# Patient Record
Sex: FEMALE | Race: WHITE | Employment: FULL TIME | URBAN - METROPOLITAN AREA
[De-identification: names, ages, dates, MRNs, and addresses within clinical notes are randomized per-mention and may not be internally consistent; named-entity substitution may affect disease eponyms.]

---
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
## 2023-07-26 DIAGNOSIS — M79.2 NEURALGIA: Primary | ICD-10-CM

## 2023-08-14 ENCOUNTER — OFFICE VISIT (OUTPATIENT)
Dept: CARDIOLOGY CLINIC | Age: 23
End: 2023-08-14
Payer: COMMERCIAL

## 2023-08-14 VITALS
SYSTOLIC BLOOD PRESSURE: 86 MMHG | WEIGHT: 162.6 LBS | HEIGHT: 66 IN | HEART RATE: 88 BPM | DIASTOLIC BLOOD PRESSURE: 60 MMHG | BODY MASS INDEX: 26.13 KG/M2 | OXYGEN SATURATION: 100 %

## 2023-08-14 DIAGNOSIS — R55 VASOVAGAL SYNCOPE: ICD-10-CM

## 2023-08-14 DIAGNOSIS — R42 DIZZINESS: ICD-10-CM

## 2023-08-14 DIAGNOSIS — I95.1 ORTHOSTATIC HYPOTENSION: ICD-10-CM

## 2023-08-14 PROCEDURE — G8419 CALC BMI OUT NRM PARAM NOF/U: HCPCS | Performed by: INTERNAL MEDICINE

## 2023-08-14 PROCEDURE — 99213 OFFICE O/P EST LOW 20 MIN: CPT | Performed by: INTERNAL MEDICINE

## 2023-08-14 PROCEDURE — G8427 DOCREV CUR MEDS BY ELIG CLIN: HCPCS | Performed by: INTERNAL MEDICINE

## 2023-08-14 PROCEDURE — A4660 SPHYG/BP APP W CUFF AND STET: HCPCS | Performed by: INTERNAL MEDICINE

## 2023-08-14 RX ORDER — ALBUTEROL SULFATE 90 UG/1
AEROSOL, METERED RESPIRATORY (INHALATION)
COMMUNITY
Start: 2023-06-05

## 2023-08-14 RX ORDER — VALACYCLOVIR HYDROCHLORIDE 500 MG/1
TABLET, FILM COATED ORAL DAILY
COMMUNITY

## 2023-08-14 RX ORDER — CHOLECALCIFEROL (VITAMIN D3) 125 MCG
CAPSULE ORAL NIGHTLY
COMMUNITY

## 2023-08-14 RX ORDER — CLINDAMYCIN PHOSPHATE 10 UG/ML
LOTION TOPICAL
COMMUNITY
Start: 2018-01-09

## 2023-08-14 RX ORDER — BUSPIRONE HYDROCHLORIDE 15 MG/1
TABLET ORAL 2 TIMES DAILY
COMMUNITY
Start: 2023-08-06

## 2023-08-14 RX ORDER — HYOSCYAMINE SULFATE 0.125 MG
TABLET,DISINTEGRATING ORAL AS NEEDED
COMMUNITY

## 2023-08-14 RX ORDER — HYDROXYZINE 50 MG/1
50 TABLET, FILM COATED ORAL 3 TIMES DAILY
COMMUNITY
Start: 2023-08-06

## 2023-08-14 RX ORDER — OMEPRAZOLE 20 MG/1
20 CAPSULE, DELAYED RELEASE ORAL DAILY
COMMUNITY
Start: 2019-09-23

## 2023-08-14 RX ORDER — KETOCONAZOLE 20 MG/G
CREAM TOPICAL
COMMUNITY
Start: 2023-06-05

## 2023-08-14 RX ORDER — MIDODRINE HYDROCHLORIDE 2.5 MG/1
2.5 TABLET ORAL 3 TIMES DAILY
Qty: 90 TABLET | Refills: 3 | Status: SHIPPED | OUTPATIENT
Start: 2023-08-14

## 2023-08-14 RX ORDER — TOPIRAMATE 50 MG/1
150 TABLET, FILM COATED ORAL 2 TIMES DAILY
COMMUNITY
Start: 2023-08-11

## 2023-08-14 RX ORDER — GLYCOPYRROLATE 1 MG/1
TABLET ORAL 2 TIMES DAILY
COMMUNITY

## 2023-08-14 RX ORDER — PRAZOSIN HYDROCHLORIDE 1 MG/1
CAPSULE ORAL NIGHTLY
COMMUNITY
Start: 2023-08-02

## 2023-08-14 RX ORDER — LISDEXAMFETAMINE DIMESYLATE 30 MG/1
CAPSULE ORAL EVERY MORNING
COMMUNITY
Start: 2023-08-11

## 2023-08-14 RX ORDER — BISACODYL 5 MG/1
TABLET, DELAYED RELEASE ORAL 2 TIMES DAILY
COMMUNITY

## 2023-08-14 RX ORDER — SUMATRIPTAN 25 MG/1
TABLET, FILM COATED ORAL
COMMUNITY
Start: 2022-05-31

## 2023-08-14 RX ORDER — GABAPENTIN 100 MG/1
200 CAPSULE ORAL 3 TIMES DAILY
COMMUNITY
Start: 2023-07-20

## 2023-08-14 RX ORDER — ONDANSETRON 4 MG/1
TABLET, ORALLY DISINTEGRATING ORAL
COMMUNITY
Start: 2021-03-02

## 2023-08-14 RX ORDER — DOXYCYCLINE HYCLATE 100 MG
TABLET ORAL 2 TIMES DAILY
COMMUNITY

## 2023-08-14 RX ORDER — FLUOCINOLONE ACETONIDE 0.1 MG/ML
SOLUTION TOPICAL
COMMUNITY

## 2023-08-14 RX ORDER — FLUOXETINE 20 MG/1
40 TABLET, FILM COATED ORAL 2 TIMES DAILY
COMMUNITY
Start: 2023-08-11

## 2023-08-14 RX ORDER — MONTELUKAST SODIUM 10 MG/1
1 TABLET ORAL DAILY
COMMUNITY
Start: 2019-11-06

## 2023-08-14 NOTE — ASSESSMENT & PLAN NOTE
Extensive work-up completed [place] continue to monitor she was counseled extensively with her mother

## 2023-08-14 NOTE — PROGRESS NOTES
she was given a prescription of blood pressure monitor as well all work-up including tilt table echo etc. at [place] was reviewed extensively    Vasovagal syncope   Extensive work-up completed [place] continue to monitor she was counseled extensively with her mother     Dyslipidemia :  All available lab work was reviewed. Patient was advised to repeat lab work before next visit. Necessary orders were placed , instructions given by myself       Counseled extensively and medication compliance urged. We discussed that for the  prevention of ASCVD our  goal is aggressive risk modification. Patient is encouraged to exercise if they can , educated about  brisk walk for 30 minutes  at least 3 to 4 times a week if there are no physical limitations  Various goals were discussed and questions answered. Continue current medications. Appropriate prescriptions are addressed and refills ordered. Questions answered and patient verbalizes understanding. Call for any problems, questions, or concerns. Greater than 60 % of time spent counseling besides reviewing data and images     Continue all other medications of all above medical condition listed as is. Return in about 3 months (around 11/14/2023). Please note this report has been partially produced using speech recognition software and may contain errors related to that system including errors in grammar, punctuation, and spelling, as well as words and phrases that may be inappropriate. If there are any questions or concerns please feel free to contact the dictating provider for clarification.

## 2023-09-12 ENCOUNTER — OFFICE VISIT (OUTPATIENT)
Dept: CARDIOLOGY CLINIC | Age: 23
End: 2023-09-12
Payer: COMMERCIAL

## 2023-09-12 VITALS
HEART RATE: 72 BPM | DIASTOLIC BLOOD PRESSURE: 60 MMHG | BODY MASS INDEX: 26.03 KG/M2 | HEIGHT: 66 IN | SYSTOLIC BLOOD PRESSURE: 92 MMHG | WEIGHT: 162 LBS

## 2023-09-12 DIAGNOSIS — F41.9 ANXIETY: ICD-10-CM

## 2023-09-12 DIAGNOSIS — F32.A DEPRESSION, UNSPECIFIED DEPRESSION TYPE: ICD-10-CM

## 2023-09-12 DIAGNOSIS — I95.1 ORTHOSTATIC HYPOTENSION: ICD-10-CM

## 2023-09-12 DIAGNOSIS — R55 VASOVAGAL SYNCOPE: Primary | ICD-10-CM

## 2023-09-12 DIAGNOSIS — R42 DIZZINESS: ICD-10-CM

## 2023-09-12 PROCEDURE — G8419 CALC BMI OUT NRM PARAM NOF/U: HCPCS | Performed by: INTERNAL MEDICINE

## 2023-09-12 PROCEDURE — 99214 OFFICE O/P EST MOD 30 MIN: CPT | Performed by: INTERNAL MEDICINE

## 2023-09-12 PROCEDURE — G8427 DOCREV CUR MEDS BY ELIG CLIN: HCPCS | Performed by: INTERNAL MEDICINE

## 2023-09-12 PROCEDURE — 1036F TOBACCO NON-USER: CPT | Performed by: INTERNAL MEDICINE

## 2023-09-12 RX ORDER — MIDODRINE HYDROCHLORIDE 5 MG/1
5 TABLET ORAL 3 TIMES DAILY
Qty: 90 TABLET | Refills: 4 | Status: SHIPPED | OUTPATIENT
Start: 2023-09-12

## 2023-09-12 NOTE — PROGRESS NOTES
CARDIOLOGY  NOTE    Chief Complaint: syncope    HPI:   Lisa is a 25y.o. year old who has Past medical history as noted below. She comes in with her mother for evaluation but she has had several admissions and recurrent episodes of syncope and was told that she has vasovagal syncope also orthostatic hypotension her blood pressure tends to be in 80s and 90s she is dizzy and lightheaded also reports having episodes of palpitation with heart running fast she was admitted [facility] where extensive work-up including echo stress test and tilt table were all completed  She feels midodrine has helped   She has an appointment to go to [facility] soon she is here with her mother and her service dog  She was evaluated by neurology as well as cardiology at [facility] and had extensive work-up she is being seen there for migraine headaches and recurrent vagal syncope so far work-up is negative  Was admitted in May 2023 tilt table in 5/20/2023 was not significant for neurocardiogenic syncope      Current Outpatient Medications   Medication Sig Dispense Refill    midodrine (PROAMATINE) 5 MG tablet Take 1 tablet by mouth 3 times daily 90 tablet 4    topiramate (TOPAMAX) 50 MG tablet 3 tablets 2 times daily      SUMAtriptan (IMITREX) 25 MG tablet once as needed      hydrOXYzine HCl (ATARAX) 50 MG tablet 1 tablet 3 times daily      busPIRone (BUSPAR) 15 MG tablet 2 times daily      FLUoxetine (PROZAC) 20 MG tablet 2 tablets 2 times daily Pt takes 20 mg twice a day and 10 mg daily      VYVANSE 30 MG capsule every morning.       prazosin (MINIPRESS) 1 MG capsule nightly      melatonin 5 MG TABS tablet nightly      glycopyrrolate (ROBINUL) 1 MG tablet 2 times daily 2 tab in the morning, 1 tab in the evening      valACYclovir (VALTREX) 500 MG tablet daily      omeprazole (PRILOSEC) 20 MG delayed release capsule 1 capsule in the morning and at bedtime      montelukast

## 2023-09-12 NOTE — PATIENT INSTRUCTIONS
**It is YOUR responsibilty to bring medication bottles and/or updated medication list to [facility]. This will allow us to better serve you and all your healthcare needs**  Thank you for allowing us to care for you today! We want to ensure we can follow your treatment plan and we strive to give you the best outcomes and experience possible. If you ever have a life threatening emergency and call 911 - for an ambulance (EMS)   Our providers can only care for you at:   [facility] or [facility]. Even if you have someone take you or you drive yourself we can only care for you in a [facility] facility. Our providers are not setup at the other healthcare locations! [facility] Locations - No appointment necessary. Sites open Monday to Friday. Call your preferred location for test preparation, business   hours and other information you need. [employer] accepts 's. [address] [place] [address]. [address] [place], [facility]  Phone: [phone]     We are committed to providing you the best care possible. If you receive a survey after visiting one of our offices, please take time to share your experience concerning your physician office visit. These surveys are confidential and no health information about you is shared. We are eager to improve for you and we are counting on your feedback to help make that happen.

## 2023-09-18 ENCOUNTER — TELEPHONE (OUTPATIENT)
Dept: CARDIOLOGY CLINIC | Age: 23
End: 2023-09-18

## 2023-09-18 RX ORDER — GABAPENTIN 100 MG/1
200 CAPSULE ORAL 3 TIMES DAILY
Qty: 180 CAPSULE | Refills: 3 | Status: SHIPPED | OUTPATIENT
Start: 2023-09-18 | End: 2026-09-03

## 2023-09-18 NOTE — TELEPHONE ENCOUNTER
She passed out and was taken to local hospital her gabapentin dose was recntly increased  She underwent CPR and taken to hospital   She was taken a selfie when this happens   She was shaking when she passed out and became non responsive  Advised to reduce gabapentin 200 mg tid   Check bp at home

## 2023-09-22 ENCOUNTER — TELEPHONE (OUTPATIENT)
Dept: CARDIOLOGY CLINIC | Age: 23
End: 2023-09-22

## 2023-09-22 NOTE — TELEPHONE ENCOUNTER
Cardiologist: Dr. Yolanda Romero  Surgeon: Dr. Lorenzo Monroe  Surgery: Adenotonsillectomy, septoplasty, and inferior turbinate lateralization  Anesthesia: General  Date: 9/27/2023  FAX# [phone]  # [phone]    Last OV 9/12/2023 w/[facility]    Orthostatic hypotension  Persistently low lying blood pressures , increase midodrine 5 twice daily and see if it helps advised to check blood pressure at home she was given a prescription of blood pressure monitor as well all work-up including tilt table echo etc. at [place] was reviewed extensively     Vasovagal syncope   Extensive work-up completed [place] continue to monitor she was counseled extensively with her mother   I am worried about polypharmacy       Dyslipidemia :  All available lab work was reviewed. Patient was advised to repeat lab work before next visit.  Necessary orders were placed , instructions given by myself         No testing on file

## 2023-09-25 ENCOUNTER — TELEPHONE (OUTPATIENT)
Dept: CARDIOLOGY CLINIC | Age: 23
End: 2023-09-25

## 2023-09-25 NOTE — TELEPHONE ENCOUNTER
Patient was at the [facility] on 9/22 and passed out. They sent her to ER. Just wanted the doctor to know. Patient is having surgery on 9/27 for her tonsils and nose.

## 2023-09-26 ENCOUNTER — TELEPHONE (OUTPATIENT)
Dept: CARDIOLOGY CLINIC | Age: 23
End: 2023-09-26

## 2023-09-26 NOTE — TELEPHONE ENCOUNTER
Jeremias's surgery will be on Oct.19th at 8:50am at [facility]. Dr. Joesph Francisco will perform her surgery. Fax number is [phone]. Phone number is : [phone]. [place] will be off work 3 weeks or more. Depending on how surgery goes. MRI on back. [facility] [phone] fax number [phone]. Dr. Netta Drummond CMT.

## 2023-10-05 ENCOUNTER — TELEPHONE (OUTPATIENT)
Dept: CARDIOLOGY CLINIC | Age: 23
End: 2023-10-05

## 2023-10-05 ENCOUNTER — HOSPITAL ENCOUNTER (OUTPATIENT)
Age: 23
Discharge: HOME | End: 2023-10-05
Payer: COMMERCIAL

## 2023-10-05 ENCOUNTER — HOSPITAL ENCOUNTER (OUTPATIENT)
Dept: GENERAL RADIOLOGY | Age: 23
Discharge: HOME | End: 2023-10-05
Payer: COMMERCIAL

## 2023-10-05 DIAGNOSIS — M54.12 CERVICAL RADICULOPATHY: ICD-10-CM

## 2023-10-05 DIAGNOSIS — G47.30 SLEEP APNEA IN ADULT: ICD-10-CM

## 2023-10-05 DIAGNOSIS — R55 VASOVAGAL SYNCOPE: Primary | ICD-10-CM

## 2023-10-05 DIAGNOSIS — R42 DIZZINESS: ICD-10-CM

## 2023-10-05 PROCEDURE — 72050 X-RAY EXAM NECK SPINE 4/5VWS: CPT

## 2023-10-11 ENCOUNTER — HOSPITAL ENCOUNTER (EMERGENCY)
Age: 23
Discharge: HOME | End: 2023-10-11
Attending: EMERGENCY MEDICINE
Payer: COMMERCIAL

## 2023-10-11 ENCOUNTER — HOSPITAL ENCOUNTER (OUTPATIENT)
Dept: PHYSICAL THERAPY | Age: 23
Setting detail: THERAPIES SERIES
Discharge: HOME | End: 2023-10-11
Payer: COMMERCIAL

## 2023-10-11 ENCOUNTER — APPOINTMENT (OUTPATIENT)
Dept: GENERAL RADIOLOGY | Age: 23
End: 2023-10-11
Payer: COMMERCIAL

## 2023-10-11 VITALS
SYSTOLIC BLOOD PRESSURE: 102 MMHG | RESPIRATION RATE: 15 BRPM | OXYGEN SATURATION: 99 % | TEMPERATURE: 98.5 F | WEIGHT: 160 LBS | HEIGHT: 66 IN | DIASTOLIC BLOOD PRESSURE: 64 MMHG | BODY MASS INDEX: 25.71 KG/M2 | HEART RATE: 59 BPM

## 2023-10-11 DIAGNOSIS — R55 SYNCOPE, UNSPECIFIED SYNCOPE TYPE: Primary | ICD-10-CM

## 2023-10-11 LAB
ALBUMIN SERPL-MCNC: 4.2 GM/DL (ref 3.4–5)
ALP BLD-CCNC: 60 IU/L (ref 40–129)
ALT SERPL-CCNC: 13 U/L (ref 10–40)
ANION GAP SERPL CALCULATED.3IONS-SCNC: 10 MMOL/L (ref 4–16)
AST SERPL-CCNC: 12 IU/L (ref 15–37)
BACTERIA: ABNORMAL /HPF
BILIRUB SERPL-MCNC: 0.3 MG/DL (ref 0–1)
BILIRUBIN URINE: NEGATIVE MG/DL
BLOOD, URINE: NEGATIVE
BUN SERPL-MCNC: 11 MG/DL (ref 6–23)
CALCIUM SERPL-MCNC: 9.5 MG/DL (ref 8.3–10.6)
CAST TYPE: ABNORMAL /HPF
CHLORIDE BLD-SCNC: 110 MMOL/L (ref 99–110)
CLARITY: ABNORMAL
CO2: 23 MMOL/L (ref 21–32)
COLOR: YELLOW
COMMENT UA: ABNORMAL
CREAT SERPL-MCNC: 0.8 MG/DL (ref 0.6–1.1)
CRYSTAL TYPE: ABNORMAL /HPF
EKG ATRIAL RATE: 55 BPM
EKG DIAGNOSIS: NORMAL
EKG P AXIS: 45 DEGREES
EKG P-R INTERVAL: 156 MS
EKG Q-T INTERVAL: 456 MS
EKG QRS DURATION: 90 MS
EKG QTC CALCULATION (BAZETT): 436 MS
EKG R AXIS: 36 DEGREES
EKG T AXIS: 28 DEGREES
EKG VENTRICULAR RATE: 55 BPM
EPITHELIAL CELLS, UA: 2 /HPF
GFR SERPL CREATININE-BSD FRML MDRD: >60 ML/MIN/1.73M2
GLUCOSE SERPL-MCNC: 94 MG/DL (ref 70–99)
GLUCOSE, URINE: NEGATIVE MG/DL
HCG QUALITATIVE: NEGATIVE
HCT VFR BLD CALC: 35.6 % (ref 37–47)
HEMOGLOBIN: 11.6 GM/DL (ref 12.5–16)
KETONES, URINE: NEGATIVE MG/DL
LEUKOCYTE ESTERASE, URINE: NEGATIVE
MAGNESIUM: 1.8 MG/DL (ref 1.8–2.4)
MCH RBC QN AUTO: 28.2 PG (ref 27–31)
MCHC RBC AUTO-ENTMCNC: 32.6 % (ref 32–36)
MCV RBC AUTO: 86.6 FL (ref 78–100)
NITRITE URINE, QUANTITATIVE: NEGATIVE
PDW BLD-RTO: 12.2 % (ref 11.7–14.9)
PH, URINE: 5.5 (ref 5–8)
PLATELET # BLD: 223 K/CU MM (ref 140–440)
PMV BLD AUTO: 10.4 FL (ref 7.5–11.1)
POTASSIUM SERPL-SCNC: 3.6 MMOL/L (ref 3.5–5.1)
PROTEIN UA: NEGATIVE MG/DL
RBC # BLD: 4.11 M/CU MM (ref 4.2–5.4)
RBC URINE: 0 /HPF (ref 0–6)
SODIUM BLD-SCNC: 143 MMOL/L (ref 135–145)
SPECIFIC GRAVITY UA: >1.03 (ref 1–1.03)
TOTAL CK: 29 IU/L (ref 26–140)
TOTAL PROTEIN: 6.7 GM/DL (ref 6.4–8.2)
TROPONIN, HIGH SENSITIVITY: <6 NG/L (ref 0–14)
TROPONIN, HIGH SENSITIVITY: <6 NG/L (ref 0–14)
UROBILINOGEN, URINE: 0.2 MG/DL (ref 0.2–1)
WBC # BLD: 6.5 K/CU MM (ref 4–10.5)
WBC UA: 4 /HPF (ref 0–5)

## 2023-10-11 PROCEDURE — 71045 X-RAY EXAM CHEST 1 VIEW: CPT

## 2023-10-11 PROCEDURE — 84484 ASSAY OF TROPONIN QUANT: CPT

## 2023-10-11 PROCEDURE — 6370000000 HC RX 637 (ALT 250 FOR IP): Performed by: EMERGENCY MEDICINE

## 2023-10-11 PROCEDURE — 97162 PT EVAL MOD COMPLEX 30 MIN: CPT

## 2023-10-11 PROCEDURE — 93005 ELECTROCARDIOGRAM TRACING: CPT | Performed by: EMERGENCY MEDICINE

## 2023-10-11 PROCEDURE — 82550 ASSAY OF CK (CPK): CPT

## 2023-10-11 PROCEDURE — 2580000003 HC RX 258: Performed by: EMERGENCY MEDICINE

## 2023-10-11 PROCEDURE — 99285 EMERGENCY DEPT VISIT HI MDM: CPT

## 2023-10-11 PROCEDURE — 81001 URINALYSIS AUTO W/SCOPE: CPT

## 2023-10-11 PROCEDURE — 93010 ELECTROCARDIOGRAM REPORT: CPT | Performed by: INTERNAL MEDICINE

## 2023-10-11 PROCEDURE — 85027 COMPLETE CBC AUTOMATED: CPT

## 2023-10-11 PROCEDURE — 80053 COMPREHEN METABOLIC PANEL: CPT

## 2023-10-11 PROCEDURE — 96360 HYDRATION IV INFUSION INIT: CPT

## 2023-10-11 PROCEDURE — 84703 CHORIONIC GONADOTROPIN ASSAY: CPT

## 2023-10-11 PROCEDURE — 83735 ASSAY OF MAGNESIUM: CPT

## 2023-10-11 RX ORDER — FLUTICASONE PROPIONATE 50 MCG
2 SPRAY, SUSPENSION (ML) NASAL 2 TIMES DAILY
COMMUNITY
Start: 2018-07-02

## 2023-10-11 RX ORDER — TOPIRAMATE 50 MG/1
150 TABLET, FILM COATED ORAL 2 TIMES DAILY
Qty: 60 TABLET | Refills: 0 | Status: SHIPPED | OUTPATIENT
Start: 2023-10-11

## 2023-10-11 RX ORDER — ACETAMINOPHEN 325 MG/1
650 TABLET ORAL ONCE
Status: COMPLETED | OUTPATIENT
Start: 2023-10-11 | End: 2023-10-11

## 2023-10-11 RX ORDER — 0.9 % SODIUM CHLORIDE 0.9 %
1000 INTRAVENOUS SOLUTION INTRAVENOUS ONCE
Status: COMPLETED | OUTPATIENT
Start: 2023-10-11 | End: 2023-10-11

## 2023-10-11 RX ADMIN — ACETAMINOPHEN 650 MG: 325 TABLET ORAL at 15:36

## 2023-10-11 RX ADMIN — SODIUM CHLORIDE 1000 ML: 9 INJECTION, SOLUTION INTRAVENOUS at 15:47

## 2023-10-11 RX ADMIN — TOPIRAMATE 150 MG: 100 TABLET, FILM COATED ORAL at 19:47

## 2023-10-11 ASSESSMENT — PAIN DESCRIPTION - FREQUENCY: FREQUENCY: CONTINUOUS

## 2023-10-11 ASSESSMENT — PAIN SCALES - GENERAL
PAINLEVEL_OUTOF10: 6
PAINLEVEL_OUTOF10: 10

## 2023-10-11 ASSESSMENT — PAIN DESCRIPTION - DESCRIPTORS
DESCRIPTORS: THROBBING
DESCRIPTORS: TENDER

## 2023-10-11 ASSESSMENT — PAIN DESCRIPTION - PAIN TYPE: TYPE: ACUTE PAIN

## 2023-10-11 ASSESSMENT — PAIN - FUNCTIONAL ASSESSMENT
PAIN_FUNCTIONAL_ASSESSMENT: PREVENTS OR INTERFERES SOME ACTIVE ACTIVITIES AND ADLS
PAIN_FUNCTIONAL_ASSESSMENT: 0-10
PAIN_FUNCTIONAL_ASSESSMENT: ACTIVITIES ARE NOT PREVENTED

## 2023-10-11 ASSESSMENT — PAIN DESCRIPTION - ONSET: ONSET: SUDDEN

## 2023-10-11 ASSESSMENT — PAIN DESCRIPTION - LOCATION
LOCATION: HEAD
LOCATION: HEAD;CHEST

## 2023-10-11 NOTE — DISCHARGE INSTRUCTIONS
Follow-up with your neurologist at the [facility] clinic for further evaluation treatment and management. Call for an appointment  Follow-up with your cardiologist in 1 to 2 days for reevaluation. Call for an appointment  Follow-up with primary care physician in 1 to 2 days for reevaluation. Call for an appointment  Take home medication as prescribed and directed  Return to the emergency department immediately any pain fever chills nausea vomiting dizzy lightheadedness or worsening symptoms.

## 2023-10-11 NOTE — FLOWSHEET NOTE
Running Total Units Total approved    TE [id number]       MAN [id number]       Gait [id number]      NR [id number]      TA  [id number]      Estim Unatt [id number]        [facility] [address]       ADL/Self care [id number]      DNT 1-2 [id number]      DNT 3-4 [id number]      Other:                 Total for episode of care             Objective Findings:    Communication with other providers:    Education to Patient:    Adverse Reaction to Treatment:    Assessment:     Treatment/Activity Tolerance:  [] Patient tolerated treatment well [] Patient limited by fatique  [] Patient limited by pain [] Patient limited by other medical complications  [] Other:     Prognosis: [] Good [] Fair  [] Poor    Patient Requires Follow-up: [] Yes  [] No    Plan:   [] Continue per plan of care [] Alter current plan (see comments)  [] Plan of care initiated [] Hold pending MD visit [] Discharge    See Weekly Progress Note:    [] Yes [] No Next due:        Electronically signed by:  Anish Saldaña PT, 10/11/2023, 1:28 PM

## 2023-10-11 NOTE — ED PROVIDER NOTES
All questions answered. Clinical Impression:  1. Syncope, unspecified syncope type      Disposition referral (if applicable):  Roque Bunn MD  [facility]  [phone]    Schedule an appointment as soon as possible for a visit in 1 day  If symptoms worsen and for re-evaluation. call for an appointment    Marleny Barone MD  [address] [facility] [address]  [phone]    Schedule an appointment as soon as possible for a visit in 1 day  If symptoms worsen and for re-evaluation. call for an appointment    [place], [address] [facility]  [phone]    Schedule an appointment as soon as possible for a visit in 1 day  If symptoms worsen and for re-evaluation. call for an appointment    [facility] Emergency Department  [place] [address]  [phone]  Go in 1 day  If symptoms worsen    Disposition medications (if applicable):  New Prescriptions    TOPIRAMATE (TOPAMAX) 50 MG TABLET    Take 3 tablets by mouth 2 times daily     ED Provider Disposition Time  DISPOSITION  6:35 PM        Comment: Please note this report has been produced using speech recognition software and may contain errors related to that system including errors in grammar, punctuation, and spelling, as well as words and phrases that may be inappropriate. Efforts were made to edit the dictations.         Sohan Negro DO  10/12/23 1233

## 2023-10-11 NOTE — PROGRESS NOTES
the patient is under my care. I agree with the treatment plan and certify that this therapy is necessary. Physician's Signature:  ___________________________   Date:_______                                                                   FREDY Persaud*        Physician Comments: _______________________________________________    Please sign and return to [place]. Please fax to the location listed below.  [facility] YOU for this referral!    [address] [place] [address]  Dept: [address]: [phone]       POC NOTE

## 2023-10-11 NOTE — PLAN OF CARE
Outpatient Physical Therapy           [place]           [] Phone: [phone]   Fax: [phone]  [place]           [x] Phone: [phone]   Fax: [phone]     To: FREDY Hairston*    From: Tiffany Jeffers PT,    Patient: Dean Hurst       : 2000  Diagnosis: Fibromyalgia [M79.7] Diagnosis: Fibromyalgia  Treatment Diagnosis: fibromyalgia  Date: 10/11/2023    Physical Therapy Certification/Re-Certification Form    The following patient has been evaluated for physical therapy services and for therapy to continue, insurance requires physician review of the treatment plan initially and every 90 days. Please review the attached evaluation and/or summary of the patient's plan of care, and verify that you agree therapy should continue by signing the attached document and sending it back to our office. Assessment:    Patient primary complaints: multjoint pain  History of condition:Hx of pain for over 10 years; prior PT for knee pain;   Current functional limitations: difficulty with sitting/ standing;  Clinical findings:NDI 27/50; AROM of spine and extremities causes pain  PLOF:Pt was independent with ADLs/IADLs without significant pain or difficulties  Patient's response to treatment:Pt tolerated  evaluation  without any adverse reactions or complications this date. . Pt would continue to benefit from skilled therapy interventions to address remaining impairments, improve mobility and strength,  and progress toward goal completion and prepare for d/c including finalizing HEP ;  while reducing risk for re-injury or further decline. Pain complaints after session 7/10   Skilled PT interventions are intended to:    decrease pain which will enable patient  to improve quality of life  Patient agrees with established plan of care and assisted in the development of their  goals  Barriers to learning:none- no mental/cognitive barriers observed  Preferred learning style(s):   written- demonstration

## 2023-10-12 NOTE — ED NOTES
Discharge instructions reviewed with patient. Reviewed medications with patient. No additional questions asked. Voiced understanding. Encouraged patient to follow up as discussed by the ED physician.       Philip Adkins RN  10/11/23 2003

## 2023-10-13 ENCOUNTER — HOSPITAL ENCOUNTER (OUTPATIENT)
Dept: PHYSICAL THERAPY | Age: 23
Setting detail: THERAPIES SERIES
Discharge: HOME | End: 2023-10-13
Payer: COMMERCIAL

## 2023-10-13 PROCEDURE — 97113 AQUATIC THERAPY/EXERCISES: CPT

## 2023-10-13 NOTE — FLOWSHEET NOTE
Physical Therapy Aquatic Flow Sheet   Date:  10/13/2023    Patient Name:  [place]    :  2000  Restrictions/Precautions:  syncope episodes  Diagnosis:     Treatment Diagnosis:     Insurance/Certification information:    Referring Physician:     Any changes in Ambulatory Summary Sheet?:  Plan of care signed (Y/N):    Visit# / total visits:  2/  Pain level: 10/10 \"overall body pain\"     Electronically signed by:  Golden Allen PTA,     Subjective: Patient arrived 13 min late for appointment; mom and service dog along with her. Patient states she is \"doing fine today\". Mom consistently talking about all of the testing Arzate is currently scheduled to do fto finally find out what is causing the syncope episodes.     Key  THERAPIST IN POOL D/T PATIENT SYNCOPE EPISODES  B= Belt DB= Dumbells T= Theratube   H= Hydrotone N= Noodles W= Weights   P= Paddles S= Speedo equipment K= Kickboard     Exercises/Activities   Warm-up/Amb    Exercises      Slow forward/backward    X 5 min; one UE rail support; close supervision  HR/TR  10x    Slow sideways  X 5 min; BUE rail supports; close supervision  Marches  10x    Slow backwards    Mini-squats      Medium forward    3-way SLR  10x ea    Medium sideways    Hip circles/fig 8      Small shuffle    Hamstring curls      Jog    Knee extension  LAQ 10x BLE; seated in pool chair    Braiding    Pelvic tilts      Bicycling    Scap squeezes          Shoulder flex/ext  Seated in pool chair; 10x; postural cues    Functional  In/out of pool via steps and BUE supports; SBA for safety  Shoulder abd/add      Step    Shoulder H. abd/add  Seated in pool chair; 10x; postural cues    Lifting    Shoulder IR/ER      Hand to opp knee    Rowing      Push down squat    Bilateral pull down      UE PNF    Push/pull  Seated in pool chair; 10x; postural cues    LE PNF    Push downs      Wall push ups    Arm circles      SLS    Elbow flex/ext          Chin tuck      Stretching    UT

## 2023-10-13 NOTE — PRE-CERTIFICATION NOTE
[facility] # [id number]     78 units each of  [id number]  [id number]  [address]  [id number]  [id number]     Dates approved - 10/13/23-04/13/24

## 2023-10-13 NOTE — PRE-CERTIFICATION NOTE
[facility] # [id number]    59 units each of  [address]  [id number]  [address]  [id number]  [id number]    Dates approved - 10/13/23-04/13/24

## 2023-10-14 ENCOUNTER — HOSPITAL ENCOUNTER (OUTPATIENT)
Dept: MRI IMAGING | Age: 23
Discharge: HOME | End: 2023-10-14
Payer: COMMERCIAL

## 2023-10-14 DIAGNOSIS — M54.16 LUMBAR RADICULOPATHY: ICD-10-CM

## 2023-10-14 PROCEDURE — 72148 MRI LUMBAR SPINE W/O DYE: CPT

## 2023-10-16 ENCOUNTER — HOSPITAL ENCOUNTER (OUTPATIENT)
Dept: PHYSICAL THERAPY | Age: 23
Setting detail: THERAPIES SERIES
Discharge: HOME | End: 2023-10-16
Payer: COMMERCIAL

## 2023-10-16 PROCEDURE — 97113 AQUATIC THERAPY/EXERCISES: CPT

## 2023-10-16 NOTE — FLOWSHEET NOTE
Physical Therapy Aquatic Flow Sheet   Date:  10/16/2023    Patient Name:  [place]    :  2000  Restrictions/Precautions:  syncope episodes  Diagnosis:     Treatment Diagnosis:     Insurance/Certification information:    Referring Physician:     Any changes in Ambulatory Summary Sheet?:  Plan of care signed (Y/N):    Visit# / total visits:  3/  Pain level: 10/10 \"overall body pain\"     Electronically signed by:  Kate Menezes PTA,     Subjective: Patient arrived 10 min late for appointment; mom and service dog along with her. Patient states that she has had no episodes since last pool visit and mom confirms same. Patient states that she was \"tired; but felt very relaxed\" after last session.     Key  THERAPIST IN POOL D/T PATIENT SYNCOPE EPISODES  B= Belt DB= Dumbells T= Theratube   H= Hydrotone N= Noodles W= Weights   P= Paddles S= Speedo equipment K= Kickboard     Exercises/Activities   Warm-up/Amb    Exercises      Slow forward/backward    X 5 min; one UE rail support; close supervision  HR/TR  10x    Slow sideways  X 5 min; BUE rail supports; close supervision  Marches  10x    Slow backwards    Mini-squats  5x    Medium forward    3-way SLR  10x ea    Medium sideways    Hip circles/fig 8      Small shuffle    Hamstring curls      Jog    Knee extension  LAQ 10x BLE; seated in pool chair    Braiding    Pelvic tilts      Bicycling    Scap squeezes          Shoulder flex/ext  Seated in pool chair; 15x; postural cues    Functional  In/out of pool via steps and BUE supports; SBA for safety  Shoulder abd/add      Step    Shoulder H. abd/add  Seated in pool chair; 15x; postural cues    Lifting    Shoulder IR/ER      Hand to opp knee    Rowing      Push down squat    Bilateral pull down      UE PNF    Push/pull  Seated in pool chair; 15x; postural cues    LE PNF    Push downs      Wall push ups    Arm circles      SLS    Elbow flex/ext          Chin tuck      Stretching    UT shrugs/rolls

## 2023-10-17 ENCOUNTER — TELEPHONE (OUTPATIENT)
Dept: CARDIOLOGY CLINIC | Age: 23
End: 2023-10-17

## 2023-10-18 ENCOUNTER — HOSPITAL ENCOUNTER (OUTPATIENT)
Dept: PHYSICAL THERAPY | Age: 23
Setting detail: THERAPIES SERIES
Discharge: HOME | End: 2023-10-18
Payer: COMMERCIAL

## 2023-10-18 PROCEDURE — 97113 AQUATIC THERAPY/EXERCISES: CPT

## 2023-10-18 NOTE — FLOWSHEET NOTE
Physical Therapy Aquatic Flow Sheet   Date:  10/18/2023    Patient Name:  [place]    :  2000  Restrictions/Precautions:  syncope episodes  Diagnosis:     Treatment Diagnosis:     Insurance/Certification information:    Referring Physician:     Any changes in Ambulatory Summary Sheet?:  Plan of care signed (Y/N):    Visit# / total visits:  4/  Pain level: 8/10 \"overall body pain\"     Electronically signed by:  Kari Tobar,     Subjective: Patient states that she is having a lot of pain today. Rates her pain 8/10.         Key  THERAPIST IN POOL D/T PATIENT SYNCOPE EPISODES  B= Belt DB= Dumbells T= Theratube   H= Hydrotone N= Noodles W= Weights   P= Paddles S= Speedo equipment K= Kickboard     Exercises/Activities   Warm-up/Amb    Exercises      Slow forward/backward    X 5 min; one UE rail support; close supervision  HR/TR  10x    Slow sideways  X 5 min; BUE rail supports; close supervision  Marches  10x    Slow backwards    Mini-squats  5x    Medium forward    3-way SLR  10x ea    Medium sideways    Hip circles/fig 8      Small shuffle    Hamstring curls      Jog    Knee extension  LAQ 10x BLE; seated in pool chair    Braiding    Pelvic tilts      Bicycling    Scap squeezes          Shoulder flex/ext  Seated in pool chair; 15x; postural cues    Functional  In/out of pool via steps and BUE supports; SBA for safety  Shoulder abd/add      Step    Shoulder H. abd/add  Seated in pool chair; 15x; postural cues    Lifting    Shoulder IR/ER      Hand to opp knee    Rowing      Push down squat    Bilateral pull down      UE PNF    Push/pull  Seated in pool chair; 15x; postural cues    LE PNF    Push downs      Wall push ups    Arm circles      SLS    Elbow flex/ext          Chin tuck      Stretching    UT shrugs/rolls      Gastroc/Soleus    Rocking horse      Hamstring          SKTC    Other      Piriformis          Hip flexor          Ladder pull          Pec stretch          Post deltoid

## 2023-10-23 ENCOUNTER — HOSPITAL ENCOUNTER (OUTPATIENT)
Dept: PHYSICAL THERAPY | Age: 23
Discharge: HOME | End: 2023-10-23

## 2023-10-23 NOTE — FLOWSHEET NOTE
Physical Therapy  Cancellation/No-show Note  Patient Name:  [place]  :  2000   Date:  10/23/2023  Cancelled visits to date: 1  No-shows to date: 0    For today's appointment patient:  [x]  Cancelled  []  Rescheduled appointment  []  No-show     Reason given by patient:  []  Patient ill  []  Conflicting appointment  []  No transportation    []  Conflict with work  []  No reason given  [x]  Other:     Comments:  Patient arrived with mother pushing her in our facility wheelchair. Mom states that patient had her tonsils and adenoids removed last Thursday and has not been feeling well since. States that she had to stop the car twice on the way here due to patient not feeling well.       Electronically signed by:  Carmine Gao PTA

## 2023-10-24 ENCOUNTER — OFFICE VISIT (OUTPATIENT)
Dept: CARDIOLOGY CLINIC | Age: 23
End: 2023-10-24
Payer: COMMERCIAL

## 2023-10-24 VITALS — DIASTOLIC BLOOD PRESSURE: 64 MMHG | HEART RATE: 89 BPM | SYSTOLIC BLOOD PRESSURE: 98 MMHG | OXYGEN SATURATION: 98 %

## 2023-10-24 DIAGNOSIS — R42 DIZZINESS: ICD-10-CM

## 2023-10-24 DIAGNOSIS — I95.1 ORTHOSTATIC HYPOTENSION: ICD-10-CM

## 2023-10-24 DIAGNOSIS — F41.9 ANXIETY: ICD-10-CM

## 2023-10-24 DIAGNOSIS — R55 VASOVAGAL SYNCOPE: Primary | ICD-10-CM

## 2023-10-24 DIAGNOSIS — F32.A DEPRESSION, UNSPECIFIED DEPRESSION TYPE: ICD-10-CM

## 2023-10-24 PROCEDURE — 99214 OFFICE O/P EST MOD 30 MIN: CPT | Performed by: INTERNAL MEDICINE

## 2023-10-24 PROCEDURE — 1036F TOBACCO NON-USER: CPT | Performed by: INTERNAL MEDICINE

## 2023-10-24 PROCEDURE — G8427 DOCREV CUR MEDS BY ELIG CLIN: HCPCS | Performed by: INTERNAL MEDICINE

## 2023-10-24 PROCEDURE — G8419 CALC BMI OUT NRM PARAM NOF/U: HCPCS | Performed by: INTERNAL MEDICINE

## 2023-10-24 PROCEDURE — G8484 FLU IMMUNIZE NO ADMIN: HCPCS | Performed by: INTERNAL MEDICINE

## 2023-10-24 RX ORDER — POTASSIUM CHLORIDE 20 MEQ/1
20 TABLET, EXTENDED RELEASE ORAL DAILY
Qty: 30 TABLET | Refills: 2 | Status: SHIPPED | OUTPATIENT
Start: 2023-10-24

## 2023-10-24 RX ORDER — METHOCARBAMOL 500 MG/1
500 TABLET, FILM COATED ORAL
COMMUNITY
Start: 2023-09-26

## 2023-10-24 RX ORDER — METHYLPREDNISOLONE 4 MG/1
TABLET ORAL
COMMUNITY
Start: 2023-10-20

## 2023-10-24 RX ORDER — TOPIRAMATE 50 MG/1
150 TABLET, FILM COATED ORAL 2 TIMES DAILY
Qty: 60 TABLET | Refills: 3 | Status: SHIPPED | OUTPATIENT
Start: 2023-10-24

## 2023-10-24 RX ORDER — VALACYCLOVIR HYDROCHLORIDE 1 G/1
TABLET, FILM COATED ORAL
COMMUNITY
Start: 2023-10-01

## 2023-10-24 NOTE — PROGRESS NOTES
No current facility-administered medications for this visit. Allergies:   Seasonal    Patient History:  No past medical history on file. No past surgical history on file. Family History   Problem Relation Age of Onset    Heart Attack Maternal Grandmother      Social History     Tobacco Use    Smoking status: Never    Smokeless tobacco: Never   Substance Use Topics    Alcohol use: Never     Comment: caffeine: none        Review of Systems:   Constitutional: No Fever or Weight Loss   Eyes: No Decreased Vision  ENT: No Headaches, Hearing Loss or Vertigo  Cardiovascular: as per note above   Respiratory: No cough or wheezing and as per note above. Gastrointestinal: No abdominal pain, appetite loss, blood in stools, constipation, diarrhea or heartburn  Genitourinary: No dysuria, trouble voiding, or hematuria  Musculoskeletal:  denies any new  joint aches , swelling  or pain   Integumentary: No rash or pruritis  Neurological: No TIA or stroke symptoms  Psychiatric: No anxiety or depression  Endocrine: No malaise, fatigue or temperature intolerance  Hematologic/Lymphatic: No bleeding problems, blood clots or swollen lymph nodes  Allergic/Immunologic: No nasal congestion or hives    Objective:      Physical Exam:  BP 98/64 (Site: Left Upper Arm, Position: Sitting, Cuff Size: Medium Adult)   Pulse 89   SpO2 98%   Wt Readings from Last 3 Encounters:   10/11/23 72.6 kg (160 lb)   09/12/23 73.5 kg (162 lb)   08/14/23 73.8 kg (162 lb 9.6 oz)     There is no height or weight on file to calculate BMI. Vitals:    10/24/23 1517   BP: 98/64   Pulse: 89   SpO2: 98%        General Appearance:  No distress, conversant  Constitutional:  Well developed, Well nourished, No acute distress, Non-toxic appearance.    HENT:  Normocephalic, Atraumatic, Bilateral external ears normal, Oropharynx moist, No oral exudates, Nose normal. Neck- Normal range of motion, No tenderness, Supple, No stridor,no apical-carotid delay  Eyes:

## 2023-10-27 ENCOUNTER — HOSPITAL ENCOUNTER (OUTPATIENT)
Dept: PHYSICAL THERAPY | Age: 23
Discharge: HOME | End: 2023-10-27

## 2023-10-31 ENCOUNTER — HOSPITAL ENCOUNTER (OUTPATIENT)
Dept: PHYSICAL THERAPY | Age: 23
Setting detail: THERAPIES SERIES
Discharge: HOME | End: 2023-10-31
Payer: COMMERCIAL

## 2023-10-31 PROCEDURE — 97113 AQUATIC THERAPY/EXERCISES: CPT

## 2023-10-31 NOTE — FLOWSHEET NOTE
Physical Therapy Aquatic Flow Sheet   Date:  10/31/2023    Patient Name:  [place]    :  2000  Restrictions/Precautions:  syncope episodes  Diagnosis:     Treatment Diagnosis:     Insurance/Certification information:    Referring Physician:     Any changes in Ambulatory Summary Sheet?:  Plan of care signed (Y/N):    Visit# / total visits:  4/  Pain level: 8/10 \"overall body pain\"     Electronically signed by:  Marija Christopher PT,     Subjective: Patient states that she is having a lot of pain today. Rates her pain 8/10.         Key  THERAPIST IN POOL D/T PATIENT SYNCOPE EPISODES  B= Belt DB= Dumbells T= Theratube   H= Hydrotone N= Noodles W= Weights   P= Paddles S= Speedo equipment K= Kickboard     Exercises/Activities   Warm-up/Amb    Exercises      Slow forward/backward    X 3 min; one UE rail support; close supervision  HR/TR      Slow sideways  X2 min; BUE rail supports; close supervision  Marches  2 x10    Slow backwards    Mini-squats  10 x    Medium forward    3-way SLR   Medium sideways    Hip circles/fig 8      Small shuffle    Hamstring curls      Jog    Knee extension  LAQ 2 x10 BLE; seated in pool chair    Braiding    Pelvic tilts      Bicycling    Scap squeezes  Standing in 5 ft x10 reps        Shoulder flex/ext  Seated in pool chair; 15x; postural cues    Functional  In/out of pool via steps and BUE supports; SBA for safety  Shoulder abd/add      Step    Shoulder H. abd/add  Seated in pool chair; 15x; postural cues- standing in 5 ft     Lifting    Shoulder IR/ER      Hand to opp knee    Rowing      Push down squat    Bilateral pull down      UE PNF    Push/pull  Standing in 5 ft 15x; postural cues- paddles open     LE PNF    Push downs      Wall push ups    Arm circles      SLS    Elbow flex/ext          Chin tuck      Stretching    UT shrugs/rolls      Gastroc/Soleus    Rocking horse      Hamstring          SKTC    Other      Piriformis          Hip flexor          Ladder pull

## 2023-11-01 ENCOUNTER — HOSPITAL ENCOUNTER (OUTPATIENT)
Dept: SLEEP CENTER | Age: 23
Discharge: HOME | End: 2023-11-01
Payer: COMMERCIAL

## 2023-11-01 VITALS
BODY MASS INDEX: 24.75 KG/M2 | HEIGHT: 66 IN | SYSTOLIC BLOOD PRESSURE: 102 MMHG | OXYGEN SATURATION: 96 % | WEIGHT: 154 LBS | HEART RATE: 77 BPM | DIASTOLIC BLOOD PRESSURE: 63 MMHG

## 2023-11-01 DIAGNOSIS — G25.81 RESTLESS LEG SYNDROME: ICD-10-CM

## 2023-11-01 DIAGNOSIS — G47.10 HYPERSOMNIA: ICD-10-CM

## 2023-11-01 DIAGNOSIS — G47.26 SHIFT WORK SLEEP DISORDER: ICD-10-CM

## 2023-11-01 DIAGNOSIS — G47.33 OSA (OBSTRUCTIVE SLEEP APNEA): ICD-10-CM

## 2023-11-01 PROCEDURE — 99211 OFF/OP EST MAY X REQ PHY/QHP: CPT

## 2023-11-01 PROCEDURE — 99204 OFFICE O/P NEW MOD 45 MIN: CPT | Performed by: INTERNAL MEDICINE

## 2023-11-01 ASSESSMENT — SLEEP AND FATIGUE QUESTIONNAIRES
HOW LIKELY ARE YOU TO NOD OFF OR FALL ASLEEP WHILE SITTING AND READING: 3
HOW LIKELY ARE YOU TO NOD OFF OR FALL ASLEEP IN A CAR, WHILE STOPPED FOR A FEW MINUTES IN TRAFFIC: 0
HOW LIKELY ARE YOU TO NOD OFF OR FALL ASLEEP WHILE WATCHING TV: 3
HOW LIKELY ARE YOU TO NOD OFF OR FALL ASLEEP WHEN YOU ARE A PASSENGER IN A CAR FOR AN HOUR WITHOUT A BREAK: 3
HOW LIKELY ARE YOU TO NOD OFF OR FALL ASLEEP WHILE LYING DOWN TO REST IN THE AFTERNOON WHEN CIRCUMSTANCES PERMIT: 3
HOW LIKELY ARE YOU TO NOD OFF OR FALL ASLEEP WHILE SITTING QUIETLY AFTER LUNCH WITHOUT ALCOHOL: 3
HOW LIKELY ARE YOU TO NOD OFF OR FALL ASLEEP WHILE SITTING INACTIVE IN A PUBLIC PLACE: 3
HOW LIKELY ARE YOU TO NOD OFF OR FALL ASLEEP WHILE SITTING AND TALKING TO SOMEONE: 3
NECK CIRCUMFERENCE (INCHES): 13
ESS TOTAL SCORE: 21

## 2023-11-01 NOTE — CONSULTS
rhonchi. No dullness on percussion. CV: Regular rate. Regular rhythm. No murmur or rub. No edema. GI: Non-tender. Non-distended. No hernia. Skin: Warm, dry, normal texture and turgor. No nodule on exposed extremities. Lymph: No cervical LAD. No supraclavicular LAD. M/S: No cyanosis. No clubbing. No joint deformity. Psych: Oriented x 3. No anxiety. Awake. Alert. Intact judgement and insight. Mallampati Airway Classification:   []1 []2 []3 []4        Assessment and Plan     Diagnosis:    Problem List          Respiratory    ANDREW (obstructive sleep apnea)      She has symptoms of ANDREW  Advised to go for the PSG           Relevant Orders    Baseline Diagnostic Sleep Study       Other    Hypersomnia      She has symptoms of ANDREW  Advised to go for the PSG             Restless leg syndrome      She is on Gabapentin and it helps         Shift work sleep disorder      Advised to find a day time if possible                  Additional Plan:     [x]  Sleep hygiene/ relaxation methods & CBTi principles review with patient   [x]  Avoid supine/back sleep until sleep study   [x]  Driving precautions   [x]  Medical consequences of untreated ANDREW   [x]  Weight loss recommendations   [x]  Diet recommendations   [x]  Exercise   []  Advised to quit smoking       []  PFT referral   []  Bariatric Program referral    Total time spent for this encounter: 45 mins    Follow-Up:    No follow-ups on file.     Electronically signed by James Henry MD on 11/1/2023 at 3:21 PM

## 2023-11-02 ENCOUNTER — HOSPITAL ENCOUNTER (OUTPATIENT)
Dept: PHYSICAL THERAPY | Age: 23
Discharge: HOME | End: 2023-11-02

## 2023-11-02 NOTE — FLOWSHEET NOTE
Physical Therapy  Cancellation/No-show Note  Patient Name:  [place]  :  2000   Date:  2023  Cancelled visits to date: 3  No-shows to date: 0    For today's appointment patient:  [x]  Cancelled  []  Rescheduled appointment  []  No-show     Reason given by patient:  []  Patient ill  []  Conflicting appointment  []  No transportation    []  Conflict with work  []  No reason given  [x]  Other:     Comments:  Flat tire on the way to therapy    Electronically signed by:  Lisa Diana PTA

## 2023-11-06 ENCOUNTER — OFFICE VISIT (OUTPATIENT)
Dept: CARDIOLOGY CLINIC | Age: 23
End: 2023-11-06
Payer: COMMERCIAL

## 2023-11-06 ENCOUNTER — HOSPITAL ENCOUNTER (OUTPATIENT)
Dept: PHYSICAL THERAPY | Age: 23
Discharge: HOME | End: 2023-11-06

## 2023-11-06 ENCOUNTER — HOSPITAL ENCOUNTER (OUTPATIENT)
Age: 23
Discharge: HOME | End: 2023-11-06
Payer: COMMERCIAL

## 2023-11-06 VITALS
OXYGEN SATURATION: 98 % | DIASTOLIC BLOOD PRESSURE: 60 MMHG | HEART RATE: 89 BPM | SYSTOLIC BLOOD PRESSURE: 88 MMHG | BODY MASS INDEX: 24.85 KG/M2 | HEIGHT: 66 IN | WEIGHT: 154.6 LBS

## 2023-11-06 DIAGNOSIS — F41.9 ANXIETY: ICD-10-CM

## 2023-11-06 DIAGNOSIS — F32.A DEPRESSION, UNSPECIFIED DEPRESSION TYPE: ICD-10-CM

## 2023-11-06 DIAGNOSIS — R55 VASOVAGAL SYNCOPE: ICD-10-CM

## 2023-11-06 DIAGNOSIS — I95.1 ORTHOSTATIC HYPOTENSION: Primary | ICD-10-CM

## 2023-11-06 DIAGNOSIS — R42 DIZZINESS: ICD-10-CM

## 2023-11-06 DIAGNOSIS — I95.1 ORTHOSTATIC HYPOTENSION: ICD-10-CM

## 2023-11-06 LAB
ANION GAP SERPL CALCULATED.3IONS-SCNC: 11 MMOL/L (ref 4–16)
BUN SERPL-MCNC: 8 MG/DL (ref 6–23)
CALCIUM SERPL-MCNC: 9.6 MG/DL (ref 8.3–10.6)
CHLORIDE BLD-SCNC: 107 MMOL/L (ref 99–110)
CO2: 22 MMOL/L (ref 21–32)
CREAT SERPL-MCNC: 0.7 MG/DL (ref 0.6–1.1)
GFR SERPL CREATININE-BSD FRML MDRD: >60 ML/MIN/1.73M2
GLUCOSE SERPL-MCNC: 83 MG/DL (ref 70–99)
POTASSIUM SERPL-SCNC: 4.1 MMOL/L (ref 3.5–5.1)
SODIUM BLD-SCNC: 140 MMOL/L (ref 135–145)

## 2023-11-06 PROCEDURE — G8427 DOCREV CUR MEDS BY ELIG CLIN: HCPCS | Performed by: INTERNAL MEDICINE

## 2023-11-06 PROCEDURE — G8484 FLU IMMUNIZE NO ADMIN: HCPCS | Performed by: INTERNAL MEDICINE

## 2023-11-06 PROCEDURE — 36415 COLL VENOUS BLD VENIPUNCTURE: CPT

## 2023-11-06 PROCEDURE — G8420 CALC BMI NORM PARAMETERS: HCPCS | Performed by: INTERNAL MEDICINE

## 2023-11-06 PROCEDURE — 80048 BASIC METABOLIC PNL TOTAL CA: CPT

## 2023-11-06 PROCEDURE — 1036F TOBACCO NON-USER: CPT | Performed by: INTERNAL MEDICINE

## 2023-11-06 PROCEDURE — 99214 OFFICE O/P EST MOD 30 MIN: CPT | Performed by: INTERNAL MEDICINE

## 2023-11-06 RX ORDER — MIDODRINE HYDROCHLORIDE 10 MG/1
10 TABLET ORAL 3 TIMES DAILY
Qty: 90 TABLET | Refills: 4 | Status: SHIPPED | OUTPATIENT
Start: 2023-11-06

## 2023-11-06 NOTE — PROGRESS NOTES
minimal ventricular ectopy and no ventricular tachycardia   There was minimal supraventricular ectopy and no SVT   There is no atrial fibrillation detected   There is no significant pause or bradycardia arrhythmia noticed   There is no symptoms that are provided   Negative event monitor for significant arrhythmia further management per   ordering physician     Echo 5/2023  This result has an attachment that is not available. Left Ventricle: Left ventricle size is normal. Normal wall thickness. No wall motion abnormalities noted. Optison enhanced. Normal systolic   function with a visually estimated EF of 60 - 65%. Normal diastolic   function. Normal left ventricular filling pressure. Tissue Doppler   velocity is normal.     Right Ventricle: Right ventricle size is normal. Normal systolic   function. Normal valve function. IVC/SVC: IVC diameter is less than or equal to 21 mm and decreases   greater than 50% during inspiration; therefore the estimated right atrial   pressure is normal (~3 mmHg        All labs, medications and tests reviewed by myself including data and history from outside source , patient and available family . Assessment & Plan:      1. Orthostatic hypotension    2. Anxiety    3. Depression, unspecified depression type    4. Dizziness    5.  Vasovagal syncope             Orthostatic hypotension  Persistently low lying blood pressures on  midodrine 5 twice daily and see if it helps advised to check blood pressure at home she was given a prescription of blood pressure monitor as well all work-up including tilt table echo etc. at [facility] was reviewed extensively  I have urged her not to pursue any further procedures unless absolutely necessary, she is already having tough time recovering from her tonsil surgery she is requesting clearance for molar surgery next  Increase midodrine to 10 mg 3 times daily take extra pill in case of blood pressure still running low may need to

## 2023-11-10 ENCOUNTER — HOSPITAL ENCOUNTER (OUTPATIENT)
Dept: PHYSICAL THERAPY | Age: 23
Discharge: HOME | End: 2023-11-10

## 2023-11-10 NOTE — FLOWSHEET NOTE
Physical Therapy  Cancellation/No-show Note  Patient Name:  [place]  :  2000   Date:  11/10/2023  Cancelled visits to date: 0  No-shows to date: 0    For today's appointment patient:  [x]  Cancelled  []  Rescheduled appointment  []  No-show     Reason given by patient:  []  Patient ill  []  Conflicting appointment  []  No transportation    []  Conflict with work  []  No reason given  [x]  Other:     Comments:  Patient at [facility]    Electronically signed by:   SOPHIE Angeles,

## 2023-11-13 ENCOUNTER — HOSPITAL ENCOUNTER (OUTPATIENT)
Dept: PHYSICAL THERAPY | Age: 23
Discharge: HOME | End: 2023-11-13

## 2023-11-13 NOTE — FLOWSHEET NOTE
Physical Therapy  Cancellation/No-show Note  Patient Name:  [place]  :  2000   Date:  2023  Cancelled visits to date: 5  No-shows to date: 0    For today's appointment patient:  [x]  Cancelled  []  Rescheduled appointment  []  No-show     Reason given by patient:  []  Patient ill  []  Conflicting appointment  []  No transportation    []  Conflict with work  []  No reason given  [x]  Other:     Comments:  Patient at [facility] clinic    Electronically signed by:  Natasha Simons PTA

## 2023-11-15 ENCOUNTER — HOSPITAL ENCOUNTER (OUTPATIENT)
Dept: PHYSICAL THERAPY | Age: 23
Discharge: HOME | End: 2023-11-15
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
## 2023-11-21 ENCOUNTER — HOSPITAL ENCOUNTER (OUTPATIENT)
Dept: PHYSICAL THERAPY | Age: 23
Discharge: HOME | End: 2023-11-21

## 2023-11-21 NOTE — FLOWSHEET NOTE
Physical Therapy  Cancellation/No-show Note  Patient Name:  [place]  :  2000   Date:  2023  Cancelled visits to date: 7  No-shows to date: 0    For today's appointment patient:  [x]  Cancelled  []  Rescheduled appointment  []  No-show     Reason given by patient:  []  Patient ill  []  Conflicting appointment  []  No transportation    []  Conflict with work  []  No reason given  [x]  Other:     Comments: Had an episode this morning.     Electronically signed by:  Georgie Cartwright PTA

## 2023-11-27 ENCOUNTER — TELEPHONE (OUTPATIENT)
Dept: CARDIOLOGY CLINIC | Age: 23
End: 2023-11-27

## 2023-11-28 ENCOUNTER — HOSPITAL ENCOUNTER (OUTPATIENT)
Dept: PHYSICAL THERAPY | Age: 23
Discharge: HOME | End: 2023-11-28

## 2023-11-28 NOTE — FLOWSHEET NOTE
Physical Therapy  Cancellation/No-show Note  Patient Name:  [place]  :  2000   Date:  2023  Cancelled visits to date: 6  No-shows to date: 0    For today's appointment patient:  [x]  Cancelled  []  Rescheduled appointment  []  No-show     Reason given by patient:  []  Patient ill  []  Conflicting appointment  []  No transportation    []  Conflict with work  [x]  No reason given  []  Other:     Comments:   Electronically signed by:  Greer England PTA

## 2023-11-29 RX ORDER — TOPIRAMATE 50 MG/1
TABLET, FILM COATED ORAL
Qty: 60 TABLET | Refills: 3 | OUTPATIENT
Start: 2023-11-29

## 2023-12-04 ENCOUNTER — HOSPITAL ENCOUNTER (OUTPATIENT)
Dept: PHYSICAL THERAPY | Age: 23
Setting detail: THERAPIES SERIES
Discharge: HOME | End: 2023-12-04
Payer: COMMERCIAL

## 2023-12-04 PROCEDURE — 97113 AQUATIC THERAPY/EXERCISES: CPT

## 2023-12-04 NOTE — FLOWSHEET NOTE
Physical Therapy Aquatic Flow Sheet   Date:  2023    Patient Name:  [place]    :  2000  Restrictions/Precautions:  syncope episodes  Diagnosis:     Treatment Diagnosis:     Insurance/Certification information:    Referring Physician:     Any changes in Ambulatory Summary Sheet?:  Plan of care signed (Y/N):    Visit# / total visits:  5/  Pain level: 8/10 \"overall body pain\"     Electronically signed by: SOPHIE Funk,     Subjective: Patient states that she is having a lot of pain today. Reports having multiple doctors visits and that's why she hasn't been able to come. Rates her pain 8/10.         Key  THERAPIST IN POOL D/T PATIENT SYNCOPE EPISODES  B= Belt DB= Dumbells T= Theratube   H= Hydrotone N= Noodles W= Weights   P= Paddles S= Speedo equipment K= Kickboard     Exercises/Activities   Warm-up/Amb    Exercises      Slow forward/backward    X 3 min; one UE rail support; close supervision  HR/TR      Slow sideways  X2 min; BUE rail supports; close supervision  Marches  2 x10    Slow backwards    Mini-squats  10 x    Medium forward    3-way SLR   Medium sideways    Hip circles/fig 8  1' CW/CCW BLE    Small shuffle    Hamstring curls      Jog    Knee extension  LAQ 2 x10 BLE; seated in pool chair    Braiding    Pelvic tilts      Bicycling    Scap squeezes  Standing in 5 ft x10 reps        Shoulder flex/ext  Seated in pool chair; 15x; postural cues    Functional  In/out of pool via steps and BUE supports; SBA for safety  Shoulder abd/add  15x standing in 5ft- postural cues    Step    Shoulder H. abd/add  Seated in pool chair; 15x; postural cues- standing in 5 ft     Lifting    Shoulder IR/ER      Hand to opp knee    Rowing      Push down squat    Bilateral pull down      UE PNF    Push/pull  Standing in 5 ft 15x; postural cues- paddles open     LE PNF    Push downs      Wall push ups    Arm circles      SLS    Elbow flex/ext          Chin tuck      Stretching    UT shrugs/rolls

## 2023-12-06 ENCOUNTER — HOSPITAL ENCOUNTER (OUTPATIENT)
Dept: PHYSICAL THERAPY | Age: 23
Setting detail: THERAPIES SERIES
Discharge: HOME | End: 2023-12-06
Payer: COMMERCIAL

## 2023-12-06 PROCEDURE — 97113 AQUATIC THERAPY/EXERCISES: CPT

## 2023-12-06 NOTE — FLOWSHEET NOTE
Physical Therapy Aquatic Flow Sheet   Date:  2023    Patient Name:  [place]    :  2000  Restrictions/Precautions:  syncope episodes  Diagnosis:     Treatment Diagnosis:     Insurance/Certification information:    Referring Physician:     Any changes in Ambulatory Summary Sheet?:  Plan of care signed (Y/N):    Visit# / total visits:  5/  Pain level: 10/10 \"overall body pain\"     Electronically signed by: SOPHIE Martin,     Subjective: Patient states that she is in a lot of pain today. Rates her pain 10/10, and states that it is everywhere.         Key  THERAPIST IN POOL D/T PATIENT SYNCOPE EPISODES  B= Belt DB= Dumbells T= Theratube   H= Hydrotone N= Noodles W= Weights   P= Paddles S= Speedo equipment K= Kickboard     Exercises/Activities   Warm-up/Amb    Exercises      Slow forward/backward    X 3 min; one UE rail support; close supervision  HR/TR      Slow sideways  X2 min; BUE rail supports; close supervision  Marches  2 x10    Slow backwards    Mini-squats  10 x2    Medium forward    3-way SLR   Medium sideways    Hip circles/fig 8  1' CW/CCW BLE    Small shuffle    Hamstring curls      Jog    Knee extension  LAQ 2 x10 BLE; seated in pool chair    Braiding    Pelvic tilts      Bicycling    Scap squeezes  Standing in 5 ft x10 reps        Shoulder flex/ext  Seated in pool chair; 15x; postural cues    Functional  In/out of pool via steps and BUE supports; SBA for safety  Shoulder abd/add  15x standing in 5ft- postural cues    Step    Shoulder H. abd/add  Seated in pool chair; 15x; postural cues- standing in 5 ft     Lifting    Shoulder IR/ER      Hand to opp knee    Rowing      Push down squat    Bilateral pull down      UE PNF    Push/pull  Standing in 5 ft 15x; postural cues- paddles open     LE PNF    Push downs      Wall push ups    Arm circles      SLS    Elbow flex/ext          Chin tuck      Stretching    UT shrugs/rolls      Gastroc/Soleus    Rocking horse

## 2023-12-11 ENCOUNTER — HOSPITAL ENCOUNTER (OUTPATIENT)
Dept: SLEEP CENTER | Age: 23
Discharge: HOME | End: 2023-12-11
Payer: COMMERCIAL

## 2023-12-11 ENCOUNTER — HOSPITAL ENCOUNTER (OUTPATIENT)
Dept: PHYSICAL THERAPY | Age: 23
Setting detail: THERAPIES SERIES
Discharge: HOME | End: 2023-12-11
Payer: COMMERCIAL

## 2023-12-11 DIAGNOSIS — G47.33 OSA (OBSTRUCTIVE SLEEP APNEA): ICD-10-CM

## 2023-12-11 PROCEDURE — 97113 AQUATIC THERAPY/EXERCISES: CPT

## 2023-12-11 PROCEDURE — 95810 POLYSOM 6/> YRS 4/> PARAM: CPT

## 2023-12-11 NOTE — FLOWSHEET NOTE
Physical Therapy Aquatic Flow Sheet   Date:  2023    Patient Name:  [place]    :  2000  Restrictions/Precautions:  syncope episodes  Diagnosis:     Treatment Diagnosis:     Insurance/Certification information:    Referring Physician:     Any changes in Ambulatory Summary Sheet?:  Plan of care signed (Y/N):    Visit# / total visits:  6/  Pain level: 10/10 \"overall body pain\"     Electronically signed by:  Rafia Ryan,     Subjective: Continues to have high pain levels overall.   Patient and Mom state that she went to see the Neurologist at the [facility] and now she has orders to see other specialists        Key  THERAPIST IN POOL D/T PATIENT SYNCOPE EPISODES  B= Belt DB= Dumbells T= Theratube   H= Hydrotone N= Noodles W= Weights   P= Paddles S= Speedo equipment K= Kickboard     Exercises/Activities   Warm-up/Amb    Exercises      Slow forward/backward    X 3 min; one UE rail support; close supervision  HR/TR      Slow sideways  X2 min; BUE rail supports; close supervision  Marches  2 x10    Slow backwards    Mini-squats  10 x2    Medium forward    3-way SLR   Medium sideways    Hip circles/fig 8  1' CW/CCW BLE    Small shuffle    Hamstring curls      Jog    Knee extension  LAQ 2 x10 BLE; seated in pool chair    Braiding    Pelvic tilts      Bicycling    Scap squeezes  Standing in 5 ft x10 reps        Shoulder flex/ext  Standing 15x; postural cues    Functional  In/out of pool via steps and BUE supports; SBA for safety  Shoulder abd/add  15x standing in 5ft- postural cues    Step    Shoulder H. abd/add   Lifting    Shoulder IR/ER      Hand to opp knee    Rowing      Push down squat    Bilateral pull down      UE PNF    Push/pull  Standing in 5 ft 15x;     LE PNF    Push downs      Wall push ups    Arm circles      SLS    Elbow flex/ext      Lunges  10x B Fwd and lateral   Chin tuck      Stretching    UT shrugs/rolls      Gastroc/Soleus    Rocking horse      Hamstring

## 2023-12-12 VITALS — WEIGHT: 154 LBS | BODY MASS INDEX: 24.75 KG/M2 | HEIGHT: 66 IN

## 2023-12-12 NOTE — PROGRESS NOTES
12/12/2023  sleep study  for [facility]  2000 is complete. Results are pending physician review.     Electronically signed by Kavitha Abebe on 12/12/2023 at 4:08 AM

## 2023-12-12 NOTE — PROGRESS NOTES
12/12/2023  sleep study  for [facility]  2000 is complete. Results are pending physician review.     Electronically signed by Miriam Gallo on 12/12/2023 at 4:21 AM

## 2023-12-22 ENCOUNTER — HOSPITAL ENCOUNTER (OUTPATIENT)
Dept: PHYSICAL THERAPY | Age: 23
Discharge: HOME | End: 2023-12-22

## 2023-12-22 NOTE — FLOWSHEET NOTE
Physical Therapy  Cancellation/No-show Note  Patient Name:  [place]  :  2000   Date:  2023  Cancelled visits to date: 6  No-shows to date: 1    For today's appointment patient:  []  Cancelled  []  Rescheduled appointment  [x]  No-show     Reason given by patient:  []  Patient ill  []  Conflicting appointment  []  No transportation    []  Conflict with work  []  No reason given  [x]  Other:     Comments: Patient's mother called after her appointment time stating that she (Mom) had fallen down the stairs and was very sore so she would not be able to bring [place] to her appointment.     Electronically signed by:  Lisa Diana PTA

## 2024-01-03 ENCOUNTER — HOSPITAL ENCOUNTER (OUTPATIENT)
Dept: PHYSICAL THERAPY | Age: 24
Setting detail: THERAPIES SERIES
Discharge: HOME | End: 2024-01-03
Payer: COMMERCIAL

## 2024-01-03 PROCEDURE — 97113 AQUATIC THERAPY/EXERCISES: CPT

## 2024-01-03 NOTE — FLOWSHEET NOTE
Physical Therapy Aquatic Flow Sheet   Date:  1/3/2024    Patient Name:  Jeremias Choudhary    :  2000  Restrictions/Precautions:  syncope episodes  Diagnosis:     Treatment Diagnosis:     Insurance/Certification information:    Referring Physician:     Any changes in Ambulatory Summary Sheet?:  Plan of care signed (Y/N):    Visit# / total visits:  7/  Pain level: 10/10 \"overall body pain\"     Electronically signed by:  [place] Soria,     Subjective: \"I'm in a lot of pain today\".  Patient's Mom reports that she is going back to the doctor and thinks that Jeremias may be released to go back to work.  Jeremias still has several appointments coming up at the [facility]       Key  THERAPIST IN POOL D/T PATIENT SYNCOPE EPISODES  B= Belt DB= Dumbells T= Theratube   H= Hydrotone N= Noodles W= Weights   P= Paddles S= Speedo equipment K= Kickboard     Exercises/Activities   Warm-up/Amb    Exercises      Slow forward/backward    X 3 min; one UE rail support; close supervision  HR/TR      Slow sideways  X2 min; BUE rail supports; close supervision  Marches  2 x10    Slow backwards    Mini-squats  10 x2    Medium forward    3-way SLR   Medium sideways    Hip circles/fig 8  1' CW/CCW BLE    Small shuffle    Hamstring curls      Jog    Knee extension  LAQ 2 x10 BLE; seated in pool chair    Braiding    Pelvic tilts      Bicycling    Scap squeezes  Standing in 5 ft x10 reps        Shoulder flex/ext  Standing 15x; postural cues    Functional  In/out of pool via steps and BUE supports; SBA for safety  Shoulder abd/add  15x standing in 5ft- postural cues    Step    Shoulder H. abd/add   Lifting    Shoulder IR/ER      Hand to opp knee    Rowing      Push down squat    Bilateral pull down      UE PNF    Push/pull  Standing in 5 ft 15x;     LE PNF    Push downs      Wall push ups    Arm circles      SLS    Elbow flex/ext      Lunges  10x B Fwd and lateral   Chin tuck      Stretching    UT shrugs/rolls      Gastroc/Soleus

## 2024-01-09 ENCOUNTER — TELEPHONE (OUTPATIENT)
Dept: CARDIOLOGY CLINIC | Age: 24
End: 2024-01-09

## 2024-01-09 NOTE — TELEPHONE ENCOUNTER
Patient's mother called to notify the doctor that patient has had four episodes of syncope, hypotension, and seizures since last ov 12/19/2023. Mother stated doctor wanted to be informed of patient's condition. Mother stated she called EMS and patient was taken to [facility] 1/8/2024. I offered patient a office visit with VERNA Parker 1/12/2024, but mother only wants to see doctor and has an ov 1/16/2023.

## 2024-01-10 ENCOUNTER — HOSPITAL ENCOUNTER (OUTPATIENT)
Dept: PHYSICAL THERAPY | Age: 24
Setting detail: THERAPIES SERIES
Discharge: HOME | End: 2024-01-10
Payer: COMMERCIAL

## 2024-01-10 PROCEDURE — 97113 AQUATIC THERAPY/EXERCISES: CPT

## 2024-01-12 ENCOUNTER — HOSPITAL ENCOUNTER (OUTPATIENT)
Dept: PHYSICAL THERAPY | Age: 24
Discharge: HOME | End: 2024-01-12

## 2024-01-12 NOTE — FLOWSHEET NOTE
Physical Therapy  Cancellation/No-show Note  Patient Name:  Jeremias Choudhary  :  2000   Date:  2024  Cancelled visits to date: 9  No-shows to date: 1    For today's appointment patient:  [x]  Cancelled  []  Rescheduled appointment  []  No-show     Reason given by patient:  [x]  Patient ill  []  Conflicting appointment  []  No transportation    []  Conflict with work  []  No reason given  []  Other:     Comments:   Electronically signed by:  [place] Soria PTA

## 2024-01-16 ENCOUNTER — HOSPITAL ENCOUNTER (EMERGENCY)
Age: 24
Discharge: HOME | End: 2024-01-16
Attending: EMERGENCY MEDICINE
Payer: COMMERCIAL

## 2024-01-16 ENCOUNTER — OFFICE VISIT (OUTPATIENT)
Dept: CARDIOLOGY CLINIC | Age: 24
End: 2024-01-16

## 2024-01-16 VITALS
TEMPERATURE: 97.5 F | OXYGEN SATURATION: 100 % | WEIGHT: 147 LBS | SYSTOLIC BLOOD PRESSURE: 118 MMHG | HEIGHT: 66 IN | DIASTOLIC BLOOD PRESSURE: 72 MMHG | HEART RATE: 66 BPM | RESPIRATION RATE: 16 BRPM | BODY MASS INDEX: 23.63 KG/M2

## 2024-01-16 VITALS
OXYGEN SATURATION: 96 % | WEIGHT: 147 LBS | HEART RATE: 87 BPM | SYSTOLIC BLOOD PRESSURE: 88 MMHG | DIASTOLIC BLOOD PRESSURE: 58 MMHG | HEIGHT: 66 IN | BODY MASS INDEX: 23.63 KG/M2

## 2024-01-16 DIAGNOSIS — R55 VASOVAGAL SYNCOPE: ICD-10-CM

## 2024-01-16 DIAGNOSIS — R07.2 PRECORDIAL PAIN: ICD-10-CM

## 2024-01-16 DIAGNOSIS — I95.1 ORTHOSTATIC HYPOTENSION: Primary | ICD-10-CM

## 2024-01-16 DIAGNOSIS — F32.A DEPRESSION, UNSPECIFIED DEPRESSION TYPE: ICD-10-CM

## 2024-01-16 DIAGNOSIS — G25.81 RESTLESS LEG SYNDROME: ICD-10-CM

## 2024-01-16 DIAGNOSIS — G40.909 NONINTRACTABLE EPILEPSY WITHOUT STATUS EPILEPTICUS, UNSPECIFIED EPILEPSY TYPE (HCC): Primary | ICD-10-CM

## 2024-01-16 DIAGNOSIS — G47.33 OSA (OBSTRUCTIVE SLEEP APNEA): ICD-10-CM

## 2024-01-16 DIAGNOSIS — F41.9 ANXIETY: ICD-10-CM

## 2024-01-16 DIAGNOSIS — G47.10 HYPERSOMNIA: ICD-10-CM

## 2024-01-16 LAB
EKG ATRIAL RATE: 57 BPM
EKG DIAGNOSIS: NORMAL
EKG P AXIS: 48 DEGREES
EKG P-R INTERVAL: 172 MS
EKG Q-T INTERVAL: 444 MS
EKG QRS DURATION: 84 MS
EKG QTC CALCULATION (BAZETT): 432 MS
EKG R AXIS: 45 DEGREES
EKG T AXIS: 39 DEGREES
EKG VENTRICULAR RATE: 57 BPM

## 2024-01-16 PROCEDURE — 99283 EMERGENCY DEPT VISIT LOW MDM: CPT

## 2024-01-16 PROCEDURE — 93010 ELECTROCARDIOGRAM REPORT: CPT | Performed by: INTERNAL MEDICINE

## 2024-01-16 PROCEDURE — 93005 ELECTROCARDIOGRAM TRACING: CPT | Performed by: EMERGENCY MEDICINE

## 2024-01-16 RX ORDER — TOPIRAMATE 50 MG/1
150 TABLET, FILM COATED ORAL 2 TIMES DAILY
Qty: 90 TABLET | Refills: 3 | Status: SHIPPED | OUTPATIENT
Start: 2024-01-16 | End: 2024-01-16

## 2024-01-16 RX ORDER — GABAPENTIN 100 MG/1
200 CAPSULE ORAL 3 TIMES DAILY
Qty: 180 CAPSULE | Refills: 0 | Status: SHIPPED | OUTPATIENT
Start: 2024-01-16 | End: 2027-01-01

## 2024-01-16 RX ORDER — FLUDROCORTISONE ACETATE 0.1 MG/1
0.1 TABLET ORAL DAILY
Qty: 90 TABLET | Refills: 3 | Status: SHIPPED | OUTPATIENT
Start: 2024-01-16

## 2024-01-16 RX ORDER — POTASSIUM CHLORIDE 20 MEQ/1
20 TABLET, EXTENDED RELEASE ORAL DAILY
Qty: 30 TABLET | Refills: 2 | Status: SHIPPED | OUTPATIENT
Start: 2024-01-16

## 2024-01-16 RX ORDER — TOPIRAMATE 50 MG/1
150 TABLET, FILM COATED ORAL 2 TIMES DAILY
Qty: 90 TABLET | Refills: 0 | Status: SHIPPED | OUTPATIENT
Start: 2024-01-16

## 2024-01-16 RX ORDER — MIDODRINE HYDROCHLORIDE 10 MG/1
10 TABLET ORAL 3 TIMES DAILY
Qty: 90 TABLET | Refills: 4 | Status: SHIPPED | OUTPATIENT
Start: 2024-01-16

## 2024-01-16 RX ORDER — GABAPENTIN 100 MG/1
200 CAPSULE ORAL 3 TIMES DAILY
Qty: 180 CAPSULE | Refills: 3 | Status: SHIPPED | OUTPATIENT
Start: 2024-01-16 | End: 2024-01-16

## 2024-01-16 ASSESSMENT — PAIN - FUNCTIONAL ASSESSMENT: PAIN_FUNCTIONAL_ASSESSMENT: NONE - DENIES PAIN

## 2024-01-16 ASSESSMENT — ENCOUNTER SYMPTOMS
GASTROINTESTINAL NEGATIVE: 1
ALLERGIC/IMMUNOLOGIC NEGATIVE: 1
EYES NEGATIVE: 1

## 2024-01-16 NOTE — ED PROVIDER NOTES
imaging she did not hit her head, I do think she is okay to go home I just did an EKG vitals are stable she has had normal activity here neuro physical exam otherwise grossly normal she is resting comfortably sleeping no distress noted to discharge patient and mother became upset that I am not getting IV fluids or blood work.  I do think she is okay to go home with sounds like a typical nonepileptic seizure and her EKG is negative vitals are stable I do think she is okay to go home and drink water to stay hydrated again she has had multiple workups before that have been negative apparently patient stable discharge given return precautions and follow-up information.    CLINICAL IMPRESSION:  Final diagnoses:   Nonintractable epilepsy without status epilepticus, unspecified epilepsy type (HCC)       (Please note that portions of this note may have been completed with a voice recognition program. Efforts were made to edit the dictations but occasionally words aremis-transcribed.)    DISPOSITION REFERRAL (if applicable):  Antonio Hi MD  [address] [place] [address]  [address]  [facility] [address]  [phone]    Schedule an appointment as soon as possible for a visit       [facility] Emergency Department  [address]  [facility] [address] [phone]    If symptoms worsen    Tg Lara DO  [address] [id number]  [phone]            DISPOSITION MEDICATIONS (if applicable):  Discharge Medication List as of 1/16/2024  1:16 PM             DO Josh Drummond James, DO  01/16/24 7257

## 2024-01-16 NOTE — PROGRESS NOTES
locally at [place]       Dyslipidemia :  All available lab work was reviewed.  Patient was advised to repeat lab work before next visit. Necessary orders were placed , instructions given by myself       Counseled extensively and medication compliance urged.  We discussed that for the  prevention of ASCVD our  goal is aggressive risk modification.Patient is encouraged to exercise if they can , educated about  brisk walk for 30 minutes  at least 3 to 4 times a week if there are no physical limitations  Various goals were discussed and questions answered. Continue current medications. Appropriate prescriptions are addressed and refills ordered.  Questions answered and patient verbalizes understanding.  Call for any problems, questions, or concerns.Greater than 60 % of time spent counseling besides reviewing data and images     Continue all other medications of all above medical condition listed as is.    Return in about 3 months (around 4/16/2024).    Please note this report has been partially produced using speech recognition software and may contain errors related to that system including errors in grammar, punctuation, and spelling, as well as words and phrases that may be inappropriate. If there are any questions or concerns please feel free to contact the dictating provider for clarification.

## 2024-01-16 NOTE — ED TRIAGE NOTES
Pt to the ED via EMS from the [facility] after having a witnessed seizure. Per EMS, it lasted approx 9 minutes. She has a hx of non epileptic seizures. BG was 107 for EMS. Pt arrives A&O x4.

## 2024-01-16 NOTE — PATIENT INSTRUCTIONS
Please be informed that if you contact our office outside of normal business hours the physician on call cannot help with any scheduling or rescheduling issues, procedure instruction questions or any type of medication issue.    We advise you for any urgent/emergency that you go to the nearest emergency room!    PLEASE CALL OUR OFFICE DURING NORMAL BUSINESS HOURS    Monday - Friday   8 am to 5 pm    [place]: [phone]    [place]: [phone]    [place]:  [phone]    **It is YOUR responsibilty to bring medication bottles and/or updated medication list to EACH APPOINTMENT. This will allow us to better serve you and all your healthcare needs**    Thank you for allowing us to care for you today!   We want to ensure we can follow your treatment plan and we strive to give you the best outcomes and experience possible.   If you ever have a life threatening emergency and call 911 - for an ambulance (EMS)   Our providers can only care for you at:   [facility] or [facility].   Even if you have someone take you or you drive yourself we can only care for you in a [facility] facility. Our providers are not setup at the other healthcare locations!     We are committed to providing you the best care possible.    If you receive a survey after visiting one of our offices, please take time to share your experience concerning your physician office visit.  These surveys are confidential and no health information about you is shared.    We are eager to improve for you and we are counting on your feedback to help make that happen.

## 2024-01-17 ENCOUNTER — TELEPHONE (OUTPATIENT)
Dept: CARDIOLOGY CLINIC | Age: 24
End: 2024-01-17

## 2024-01-23 ENCOUNTER — HOSPITAL ENCOUNTER (OUTPATIENT)
Dept: PHYSICAL THERAPY | Age: 24
Setting detail: THERAPIES SERIES
Discharge: HOME | End: 2024-01-23
Payer: COMMERCIAL

## 2024-01-23 PROCEDURE — 97113 AQUATIC THERAPY/EXERCISES: CPT

## 2024-01-23 NOTE — FLOWSHEET NOTE
Physical Therapy Aquatic Flow Sheet   Date:  2024    Patient Name:  Jeremias Choudhary    :  2000  Restrictions/Precautions:  syncope episodes  Diagnosis:     Treatment Diagnosis:     Insurance/Certification information:    Referring Physician:     Any changes in Ambulatory Summary Sheet?:  Plan of care signed (Y/N):    Visit# / total visits:  8/  Pain level: 10/10 \"overall body pain\"     Electronically signed by:  Florida Soria,     Subjective:   Patient rates her pain 10/10 currently.  Is to return to work on Monday.  States that she is ready to go back to work.        Key  THERAPIST IN POOL D/T PATIENT SYNCOPE EPISODES  B= Belt DB= Dumbells T= Theratube   H= Hydrotone N= Noodles W= Weights   P= Paddles S= Speedo equipment K= Kickboard     Exercises/Activities   Warm-up/Amb    Exercises      Slow forward/backward    10 min; one UE rail support; close supervision  HR/TR      Slow sideways   Marches  2 x10    Slow backwards    Mini-squats  10 x3  x 2 t    Medium forward    3-way SLR  10x ea    Medium sideways    Hip circles/fig 8  1' CW/CCW BLE    Small shuffle    Hamstring curls      Jog    Knee extension  LAQ 2 x10 BLE; seated in pool chair    Braiding    Pelvic tilts      Bicycling    Scap squeezes  Standing in 5 ft x10 reps        Shoulder flex/ext  Standing 2 [id number] cues    Functional  In/out of pool via steps and BUE supports; SBA for safety  Shoulder abd/add  2 x15x standing in 5ft- postural cues    Step  Step ups on bottom step 10x B  Shoulder H. abd/add   Lifting    Shoulder IR/ER      Hand to opp knee    Rowing      Push down squat    Bilateral pull down      UE PNF    Push/pull  Standing in 5 ft 15x;     LE PNF    Push downs      Wall push ups    Arm circles      SLS    Elbow flex/ext      Lunges  10x B Fwd and lateral   Chin tuck      Stretching    UT shrugs/rolls      Gastroc/Soleus    Rocking horse      Hamstring  3x30\"        SKTC    Other      Piriformis          Hip flexor

## 2024-01-29 ENCOUNTER — OFFICE VISIT (OUTPATIENT)
Dept: CARDIOLOGY CLINIC | Age: 24
End: 2024-01-29

## 2024-01-29 VITALS
RESPIRATION RATE: 16 BRPM | HEART RATE: 84 BPM | HEIGHT: 66 IN | WEIGHT: 147.6 LBS | DIASTOLIC BLOOD PRESSURE: 80 MMHG | SYSTOLIC BLOOD PRESSURE: 116 MMHG | OXYGEN SATURATION: 98 % | BODY MASS INDEX: 23.72 KG/M2

## 2024-01-29 DIAGNOSIS — R07.2 PRECORDIAL PAIN: ICD-10-CM

## 2024-01-29 DIAGNOSIS — G25.81 RESTLESS LEG SYNDROME: ICD-10-CM

## 2024-01-29 DIAGNOSIS — G47.33 OSA (OBSTRUCTIVE SLEEP APNEA): ICD-10-CM

## 2024-01-29 DIAGNOSIS — I95.1 ORTHOSTATIC HYPOTENSION: Primary | ICD-10-CM

## 2024-01-29 DIAGNOSIS — R42 DIZZINESS: ICD-10-CM

## 2024-01-29 DIAGNOSIS — R55 VASOVAGAL SYNCOPE: ICD-10-CM

## 2024-01-29 NOTE — PROGRESS NOTES
CARDIOLOGY  NOTE    Chief Complaint: syncope    HPI:   Jeremias is a 23 y.o. year old who has Past medical history as noted below.  She comes in with her mother for an urgent visit she has 2 more events of passing out for which she had evaluation at [facility] and advised to cut down on meds , avoid poly pharmacy she had TILt  table was abnormal    She has been to the emergency department several times and actually had tonsil surgery at [facility] they have multiple complaints she has history of recurrent syncope and apparently had another syncope requiring CPR postsurgery .  Due to passing out low blood pressures she is currently taking midodrine 10 mg 3 times a day she was seen at  [facility] .  She had several seizures and had few visits to urgent care and ED   \" Mother is primarily directing all conversation '   she has had several admissions and recurrent episodes of syncope and was told that she has vasovagal syncope also orthostatic hypotension her blood pressure tends to be in 80s and 90s she is dizzy and lightheaded also reports having episodes of palpitation with heart running fast she was admitted [facility] where extensive work-up including echo stress test and tilt table were all completed  She feels midodrine has helped \" but she was not getting postsurgery\" her blood pressure often drops down to 80 x 50 and she gets very symptomatic    She was evaluated by neurology as well as cardiology at [facility] and had extensive work-up she is being seen there for migraine headaches and recurrent vagal syncope so far work-up is negative  Was admitted in May 2023 tilt table in 5/20/2023 was not significant for neurocardiogenic syncope      Current Outpatient Medications   Medication Sig Dispense Refill    potassium chloride (KLOR-CON M) 20 MEQ extended release tablet Take 1 tablet by mouth daily 30 tablet 2    midodrine

## 2024-02-16 ENCOUNTER — HOSPITAL ENCOUNTER (OUTPATIENT)
Dept: PHYSICAL THERAPY | Age: 24
Setting detail: THERAPIES SERIES
Discharge: HOME | End: 2024-02-16
Payer: COMMERCIAL

## 2024-02-16 PROCEDURE — 97113 AQUATIC THERAPY/EXERCISES: CPT

## 2024-02-16 NOTE — FLOWSHEET NOTE
Physical Therapy Aquatic Flow Sheet   Date:  2024    Patient Name:  Jeremias Choudhary    :  2000  Restrictions/Precautions:  syncope episodes  Diagnosis:     Treatment Diagnosis:     Insurance/Certification information:    Referring Physician:     Any changes in Ambulatory Summary Sheet?:  Plan of care signed (Y/N):    Visit# / total visits:  9/  Pain level: 10/10 \"overall body pain\"       Electronically signed by:  Leilani Santoyo, PTA,     Subjective:   Patient states that she has returned to work at [employer] part time; 3 days a week and this is her second week back. Reports fatigue with working; but mentally she really enjoys being back to work. Patient and mom report that last episode of passing out was 2 weeks ago and medics had to do CPR per mom.        Key  THERAPIST IN POOL D/T PATIENT SYNCOPE EPISODES  B= Belt DB= Dumbells T= Theratube   H= Hydrotone N= Noodles W= Weights   P= Paddles S= Speedo equipment K= Kickboard     Exercises/Activities   Warm-up/Amb    Exercises      Slow forward/backward    10 min; one UE rail support  HR/TR      Slow sideways  5 min; B rail support  Marches  1.5 min one UE support    Slow backwards    Mini-squats  15 x    Medium forward    3-way SLR  15x ea (all together) one UE support    Medium sideways    Hip circles/fig 8  1' CW/CCW BLE    Small shuffle    Hamstring curls      Jog    Knee extension      Braiding    Pelvic tilts      Bicycling  DBL noodle support in 6 ft and CGA for safety x 5 min  Scap squeezes          Shoulder flex/ext  Standing in  5 ft water x15  postural cues    Functional  In/out of pool via steps and BUE supports; supervision for safety  Shoulder abd/add  Standing in  5 ft water x15  postural cues    Step  Step ups on bottom step 10x B; HRA  Shoulder H. abd/add  Standing in  5 ft water x15  postural cues    Lifting    Shoulder IR/ER      Hand to opp knee    Rowing      Push down squat    Bilateral pull down      UE PNF    Push/pull

## 2024-02-22 ENCOUNTER — HOSPITAL ENCOUNTER (OUTPATIENT)
Dept: PHYSICAL THERAPY | Age: 24
Discharge: HOME | End: 2024-02-22

## 2024-02-26 ENCOUNTER — OFFICE VISIT (OUTPATIENT)
Dept: CARDIOLOGY CLINIC | Age: 24
End: 2024-02-26
Payer: COMMERCIAL

## 2024-02-26 VITALS
DIASTOLIC BLOOD PRESSURE: 46 MMHG | OXYGEN SATURATION: 96 % | HEIGHT: 66 IN | BODY MASS INDEX: 23.63 KG/M2 | HEART RATE: 92 BPM | SYSTOLIC BLOOD PRESSURE: 94 MMHG | WEIGHT: 147 LBS

## 2024-02-26 DIAGNOSIS — R07.2 PRECORDIAL PAIN: ICD-10-CM

## 2024-02-26 DIAGNOSIS — G47.33 OSA (OBSTRUCTIVE SLEEP APNEA): ICD-10-CM

## 2024-02-26 DIAGNOSIS — R42 DIZZINESS: ICD-10-CM

## 2024-02-26 DIAGNOSIS — I95.1 ORTHOSTATIC HYPOTENSION: Primary | ICD-10-CM

## 2024-02-26 DIAGNOSIS — R55 VASOVAGAL SYNCOPE: ICD-10-CM

## 2024-02-26 DIAGNOSIS — F41.9 ANXIETY: ICD-10-CM

## 2024-02-26 PROCEDURE — G8420 CALC BMI NORM PARAMETERS: HCPCS | Performed by: INTERNAL MEDICINE

## 2024-02-26 PROCEDURE — G8484 FLU IMMUNIZE NO ADMIN: HCPCS | Performed by: INTERNAL MEDICINE

## 2024-02-26 PROCEDURE — G8427 DOCREV CUR MEDS BY ELIG CLIN: HCPCS | Performed by: INTERNAL MEDICINE

## 2024-02-26 PROCEDURE — 99214 OFFICE O/P EST MOD 30 MIN: CPT | Performed by: INTERNAL MEDICINE

## 2024-02-26 PROCEDURE — 1036F TOBACCO NON-USER: CPT | Performed by: INTERNAL MEDICINE

## 2024-02-26 NOTE — PROGRESS NOTES
lab work was reviewed.  Patient was advised to repeat lab work before next visit. Necessary orders were placed , instructions given by myself       Counseled extensively and medication compliance urged.  We discussed that for the  prevention of ASCVD our  goal is aggressive risk modification.Patient is encouraged to exercise if they can , educated about  brisk walk for 30 minutes  at least 3 to 4 times a week if there are no physical limitations  Various goals were discussed and questions answered. Continue current medications. Appropriate prescriptions are addressed and refills ordered.  Questions answered and patient verbalizes understanding.  Call for any problems, questions, or concerns.Greater than 60 % of time spent counseling besides reviewing data and images     Continue all other medications of all above medical condition listed as is.    Return in about 3 months (around 5/26/2024).    Please note this report has been partially produced using speech recognition software and may contain errors related to that system including errors in grammar, punctuation, and spelling, as well as words and phrases that may be inappropriate. If there are any questions or concerns please feel free to contact the dictating provider for clarification.

## 2024-02-27 ENCOUNTER — HOSPITAL ENCOUNTER (OUTPATIENT)
Dept: PHYSICAL THERAPY | Age: 24
Setting detail: THERAPIES SERIES
Discharge: HOME | End: 2024-02-27
Payer: COMMERCIAL

## 2024-02-27 PROCEDURE — 97113 AQUATIC THERAPY/EXERCISES: CPT

## 2024-02-27 NOTE — FLOWSHEET NOTE
Dangle deep water  DBL noodle; CGA for safety x 5 min    Hip flexor    Seated B UT stretch and cervical rotation stretch  30\"x2    Ladder pull         Cervical SB         Cervical Rotation          Treatment Included:  [] Therapeutic Exercise   [] Instruction in HEP  [] Group   [] Therapeutic Activity      [] Neuromuscular Re-education [x] Aquatic   [] Gait             [] Manual  Other:    Time In/ Time Out: 1030/1115    Timed Code Treatment Minutes:  45 min (3 aquatics)    Total Treatment Minutes:    If [employer] Please Indicate Units for Rx this date and running total for each and overall total for Rx to date:  [employer] auth # [id number]     52 units each of  13104  55440  37829  02549  87337  02519     Dates approved - 10/13/23-04/13/24  CPT Code Units today Running Total Units Total approved    TE [id number]       MAN 68372       Gait 87115      NR 29607      TA  11771      Estim Unatt 90371        72506      [facility] [address]      [facility] [address]       ADL/Self care 59946      DNT 1-2 03793      DNT 3-4 57337      Other:   95509  Aquatic 3 36 52          Total for episode of care             Objective Findings: Patient able to complete pool session with no episodes; cues to improve postural awareness.    Communication with other providers:    Education to Patient:    Adverse Reaction to Treatment: none    Assessment: Patient rated her pain 6/10 after treatment.  Noted some fatigue at the end of treatment.      Treatment/Activity Tolerance:  [x] Patient tolerated treatment well [] Patient limited by fatique  [] Patient limited by pain [] Patient limited by other medical complications  [] Other:     Prognosis: [x] Good [] Fair  [] Poor    Patient Requires Follow-up: [x] Yes  [] No    Plan:   [x] Continue per plan of care [] Alter current plan (see comments)  [] Plan of care initiated [] Hold pending MD visit [] Discharge    See Weekly Progress Note:    [] Yes [] No Next due:        Electronically signed by:  Florida Soria

## 2024-03-07 ENCOUNTER — HOSPITAL ENCOUNTER (OUTPATIENT)
Dept: PHYSICAL THERAPY | Age: 24
Discharge: HOME | End: 2024-03-07

## 2024-03-07 NOTE — FLOWSHEET NOTE
Physical Therapy  Cancellation/No-show Note  Patient Name:  Jeremias Choudhary  :  2000   Date:  3/7/2024  Cancelled visits to date: 10  No-shows to date: 1    For today's appointment patient:  [x]  Cancelled  []  Rescheduled appointment  []  No-show     Reason given by patient:  []  Patient ill  []  Conflicting appointment  []  No transportation    []  Conflict with work  []  No reason given  [x]  Other:     Comments: Cancel per facitlity  Electronically signed by:  Florida Soria, PTA

## 2024-03-11 ENCOUNTER — TELEPHONE (OUTPATIENT)
Dept: CARDIOLOGY CLINIC | Age: 24
End: 2024-03-11

## 2024-03-11 NOTE — TELEPHONE ENCOUNTER
Mom called Jeremias she was advised to not   To return to work due to syncope episodes  At work she had had 3 last week pleas advise   She stated she needs information to Lu

## 2024-03-14 ENCOUNTER — HOSPITAL ENCOUNTER (OUTPATIENT)
Dept: PHYSICAL THERAPY | Age: 24
Discharge: HOME | End: 2024-03-14

## 2024-03-14 NOTE — FLOWSHEET NOTE
Physical Therapy  Cancellation/No-show Note  Patient Name:  Jeremias Choudhary  :  2000   Date:  3/14/2024  Cancelled visits to date: 11  No-shows to date: 1    For today's appointment patient:  [x]  Cancelled  []  Rescheduled appointment  []  No-show     Reason given by patient:  [x]  Patient ill  []  Conflicting appointment  []  No transportation    []  Conflict with work  []  No reason given  []  Other:     Comments:     Electronically signed by:  Florida Soria PTA

## 2024-03-21 ENCOUNTER — HOSPITAL ENCOUNTER (OUTPATIENT)
Dept: PHYSICAL THERAPY | Age: 24
Setting detail: THERAPIES SERIES
Discharge: HOME | End: 2024-03-21
Payer: COMMERCIAL

## 2024-03-21 PROCEDURE — 97113 AQUATIC THERAPY/EXERCISES: CPT

## 2024-03-21 NOTE — FLOWSHEET NOTE
Dangle deep water  DBL noodle; CGA for safety x 5 min    Hip flexor    Seated B UT stretch and cervical rotation stretch  30\"x2    Ladder pull         Cervical SB         Cervical Rotation          Treatment Included:  [] Therapeutic Exercise   [] Instruction in HEP  [] Group   [] Therapeutic Activity      [] Neuromuscular Re-education [x] Aquatic   [] Gait             [] Manual  Other:    Time In/ Time Out: 1030/1115    Timed Code Treatment Minutes:  45 min (3 aquatics)    Total Treatment Minutes:    If [employer] Please Indicate Units for Rx this date and running total for each and overall total for Rx to date:  [employer] auth # [id number]     52 units each of  54521  33806  11615  93871  11473  38950     Dates approved - 10/13/23-04/13/24  CPT Code Units today Running Total Units Total approved    TE 93937       MAN 34218       Gait 88138      NR 58521      TA  62853      Estim Unatt 60348        91445      [facility] [address]      [facility] [address]       ADL/Self care 90910      DNT 1-2 64376      DNT 3-4 99265      Other:   65590  Aquatic 3 39 52          Total for episode of care             Objective Findings: Patient able to complete pool session with no episodes; cues to improve postural awareness.    Communication with other providers:    Education to Patient:    Adverse Reaction to Treatment: none    Assessment: Patient rated her pain 6/10 after treatment.  Noted some fatigue at the end of treatment.      Treatment/Activity Tolerance:  [x] Patient tolerated treatment well [] Patient limited by fatique  [] Patient limited by pain [] Patient limited by other medical complications  [] Other:     Prognosis: [x] Good [] Fair  [] Poor    Patient Requires Follow-up: [x] Yes  [] No    Plan:   [x] Continue per plan of care [] Alter current plan (see comments)  [] Plan of care initiated [] Hold pending MD visit [] Discharge    See Weekly Progress Note:    [] Yes [] No Next due:        Electronically signed by:  Florida Soria

## 2024-03-26 ENCOUNTER — HOSPITAL ENCOUNTER (OUTPATIENT)
Dept: PHYSICAL THERAPY | Age: 24
Discharge: HOME | End: 2024-03-26

## 2024-03-26 NOTE — FLOWSHEET NOTE
Physical Therapy  Cancellation/No-show Note  Patient Name:  Jeremias Choudhary  :  2000   Date:  3/26/2024  Cancelled visits to date: 12  No-shows to date: 1    For today's appointment patient:  [x]  Cancelled  []  Rescheduled appointment  []  No-show     Reason given by patient:  []  Patient ill  []  Conflicting appointment  []  No transportation    []  Conflict with work  []  No reason given  [x]  Other:     Comments: mother ill    Electronically signed by:  CHANEL Zamora PT,

## 2024-04-03 ENCOUNTER — TELEPHONE (OUTPATIENT)
Dept: CARDIOLOGY CLINIC | Age: 24
End: 2024-04-03

## 2024-04-03 NOTE — TELEPHONE ENCOUNTER
Patient had an episode lasted 8 mins.  Patient did come out of it.   Patient's last episode 3/6. Patient's Mom reporting.

## 2024-04-04 ENCOUNTER — TELEPHONE (OUTPATIENT)
Dept: CARDIOLOGY CLINIC | Age: 24
End: 2024-04-04

## 2024-04-05 ENCOUNTER — HOSPITAL ENCOUNTER (OUTPATIENT)
Dept: PHYSICAL THERAPY | Age: 24
Discharge: HOME | End: 2024-04-05

## 2024-04-05 NOTE — FLOWSHEET NOTE
Physical Therapy  Cancellation/No-show Note  Patient Name:  Jeremias Choudhary  :  2000   Date:  2024  Cancelled visits to date: 13  No-shows to date: 1    For today's appointment patient:  [x]  Cancelled  []  Rescheduled appointment  []  No-show     Reason given by patient:  []  Patient ill  []  Conflicting appointment  []  No transportation    []  Conflict with work  []  No reason given  [x]  Other:     Comments: mother ill    Electronically signed by:  Florida Soria,

## 2024-04-10 ENCOUNTER — HOSPITAL ENCOUNTER (OUTPATIENT)
Dept: PHYSICAL THERAPY | Age: 24
Setting detail: THERAPIES SERIES
Discharge: HOME | End: 2024-04-10
Payer: COMMERCIAL

## 2024-04-10 PROCEDURE — 97113 AQUATIC THERAPY/EXERCISES: CPT

## 2024-04-10 NOTE — FLOWSHEET NOTE
Physical Therapy Aquatic Flow Sheet   Date:  4/10/2024    Patient Name:  Jeremias Choudhary    :  2000  Restrictions/Precautions:  syncope episodes  Diagnosis:     Treatment Diagnosis:     Insurance/Certification information:    Referring Physician:     Any changes in Ambulatory Summary Sheet?:  Plan of care signed (Y/N):    Visit# / total visits:  11/  Pain level: 8/10 \"overall body pain\"       Electronically signed by:  CHANEL Zamora PT,     Subjective:  patient to see pain management; patient reports less episodes of seizure and less intensity of fibromyalgia since starting PT/aquatic therapy       Key  THERAPIST IN POOL D/T PATIENT SYNCOPE EPISODES  B= Belt DB= Dumbells T= Theratube   H= Hydrotone N= Noodles W= Weights   P= Paddles S= Speedo equipment K= Kickboard     Exercises/Activities   Warm-up/Amb    Exercises      Slow forward/backward    10 min; one UE rail support  HR/TR      Slow sideways  5 min; B rail support  Marches  2 x15 reps one UE support    Slow backwards    Mini-squats  15 x    Medium forward    3-way SLR  15x ea (all together) one UE support    Medium sideways    Hip circles/fig 8      Small shuffle    Hamstring curls      Jog    Knee extension      Braiding    Pelvic tilts      Bicycling  DBL noodle support in 6 ft and CGA for safety x 5 min  Scap squeezes          Shoulder flex/ext  Standing in  5 ft water x15  postural cues    Functional  In/out of pool via steps and BUE supports; supervision for safety  Shoulder abd/add  Standing in  5 ft water x15  postural cues    Step    Shoulder H. abd/add   Lifting    Shoulder IR/ER      Hand to opp knee    Rowing      Push down squat    Bilateral pull down      UE PNF    Push/pull   LE PNF    Push downs      Wall push ups    Arm circles      SLS    Elbow flex/ext      Lunges    Chin tuck      Stretching    UT/rolls  X15 rolls    Gastroc/Soleus    Rocking horse      Hamstring  2x30\" B        SKTC    Other      Piriformis    Dangle deep

## 2024-04-12 NOTE — PRE-CERTIFICATION NOTE
Patient approved for 80 units from 4/12/24 to 7/31/24.    Cpt codes approved:    14837 40 UNITS  74560 20 UNITS  80091 20 UNITS     AUTH# [id number]   Yes

## 2024-04-18 ENCOUNTER — TELEPHONE (OUTPATIENT)
Dept: CARDIOLOGY CLINIC | Age: 24
End: 2024-04-18

## 2024-04-22 ENCOUNTER — HOSPITAL ENCOUNTER (OUTPATIENT)
Dept: PHYSICAL THERAPY | Age: 24
Discharge: HOME | End: 2024-04-22

## 2024-04-22 NOTE — FLOWSHEET NOTE
Physical Therapy  Cancellation/No-show Note  Patient Name:  Jeremias Choudhary  :  2000   Date:  2024  Cancelled visits to date: 14  No-shows to date: 1    For today's appointment patient:  [x]  Cancelled  []  Rescheduled appointment  []  No-show     Reason given by patient:  []  Patient ill  []  Conflicting appointment  []  No transportation    []  Conflict with work  []  No reason given  []  Other:     Comments:   Electronically signed by:  Florida Soria  PTA

## 2024-05-07 RX ORDER — POTASSIUM CHLORIDE 1500 MG/1
20 TABLET, EXTENDED RELEASE ORAL DAILY
Qty: 30 TABLET | Refills: 5 | Status: SHIPPED | OUTPATIENT
Start: 2024-05-07

## 2024-05-15 ENCOUNTER — HOSPITAL ENCOUNTER (OUTPATIENT)
Dept: PHYSICAL THERAPY | Age: 24
Setting detail: THERAPIES SERIES
Discharge: HOME | End: 2024-05-15
Payer: COMMERCIAL

## 2024-05-15 PROCEDURE — 97113 AQUATIC THERAPY/EXERCISES: CPT

## 2024-05-15 NOTE — FLOWSHEET NOTE
Physical Therapy Aquatic Flow Sheet   Date:  5/15/2024    Patient Name:  Jeremias Choudhary    :  2000  Restrictions/Precautions:  syncope episodes  Diagnosis:     Treatment Diagnosis:     Insurance/Certification information:    Referring Physician:     Any changes in Ambulatory Summary Sheet?:  Plan of care signed (Y/N):    Visit# / total visits:  12/  Pain level: 8/10 \"overall body pain\"       Electronically signed by:  CHANEL Zamora PT,     Subjective:  patient reports she saw a physician and physician is planning injections and to continue PT  Patient off work    Key  THERAPIST IN POOL D/T PATIENT SYNCOPE EPISODES  B= Belt DB= Dumbells T= Theratube   H= Hydrotone N= Noodles W= Weights   P= Paddles S= Speedo equipment K= Kickboard     Exercises/Activities   Warm-up/Amb    Exercises      Slow forward/backward    10 min; one UE rail support  HR/TR      Slow sideways  5 min; B rail support  Marches  2 x15 reps one UE support    Slow backwards    Mini-squats  15 x    Medium forward    3-way SLR  2 x15 eaa (all together) one UE support    Medium sideways    Hip circles/fig 8      Small shuffle    Hamstring curls      Jog    Knee extension      Braiding    Pelvic tilts      Bicycling    Scap squeezes          Shoulder flex/ext  Standing in  5 ft water 2 x15  postural cues    Functional  In/out of pool via steps and BUE supports; supervision for safety  Shoulder abd/add  Standing in  5 ft water 2 x15  postural cues    Step    Shoulder H. abd/add  Standing in  5 ft water 2 x15  postural cues    Lifting    Shoulder IR/ER      Hand to opp knee    Rowing      Push down squat    Bilateral pull down      UE PNF    Push/pull   LE PNF    Push downs      Wall push ups    Arm circles      SLS    Elbow flex/ext      Lunges    Chin tuck      Stretching    UT/rolls  2 X15 rolls    Gastroc/Soleus    Rocking horse      Hamstring  2x30\" B        SKTC    Other      Piriformis    Dangle deep water      Hip flexor    Seated

## 2024-05-21 ENCOUNTER — OFFICE VISIT (OUTPATIENT)
Dept: CARDIOLOGY CLINIC | Age: 24
End: 2024-05-21
Payer: COMMERCIAL

## 2024-05-21 VITALS
WEIGHT: 145 LBS | OXYGEN SATURATION: 98 % | SYSTOLIC BLOOD PRESSURE: 88 MMHG | DIASTOLIC BLOOD PRESSURE: 68 MMHG | HEIGHT: 66 IN | HEART RATE: 67 BPM | BODY MASS INDEX: 23.3 KG/M2

## 2024-05-21 DIAGNOSIS — G47.33 OSA (OBSTRUCTIVE SLEEP APNEA): ICD-10-CM

## 2024-05-21 DIAGNOSIS — F32.A DEPRESSION, UNSPECIFIED DEPRESSION TYPE: ICD-10-CM

## 2024-05-21 DIAGNOSIS — R07.2 PRECORDIAL PAIN: ICD-10-CM

## 2024-05-21 DIAGNOSIS — R55 VASOVAGAL SYNCOPE: ICD-10-CM

## 2024-05-21 DIAGNOSIS — F41.9 ANXIETY: ICD-10-CM

## 2024-05-21 DIAGNOSIS — R42 DIZZINESS: Primary | ICD-10-CM

## 2024-05-21 PROCEDURE — G8420 CALC BMI NORM PARAMETERS: HCPCS | Performed by: INTERNAL MEDICINE

## 2024-05-21 PROCEDURE — 1036F TOBACCO NON-USER: CPT | Performed by: INTERNAL MEDICINE

## 2024-05-21 PROCEDURE — 99214 OFFICE O/P EST MOD 30 MIN: CPT | Performed by: INTERNAL MEDICINE

## 2024-05-21 PROCEDURE — G8427 DOCREV CUR MEDS BY ELIG CLIN: HCPCS | Performed by: INTERNAL MEDICINE

## 2024-05-21 RX ORDER — FLUOXETINE 20 MG/1
TABLET ORAL
COMMUNITY

## 2024-05-21 RX ORDER — SERTRALINE HYDROCHLORIDE 100 MG/1
100 TABLET, FILM COATED ORAL DAILY
COMMUNITY
Start: 2024-05-08

## 2024-05-21 NOTE — FLOWSHEET NOTE
New order/pt paperwork sent over from [facility]'s new pain management doctor.  Paperwork has been scanned and placed in her chart.

## 2024-05-21 NOTE — PROGRESS NOTES
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  Patient was advised to repeat lab work before next visit. Necessary orders were placed , instructions given by myself       Counseled extensively and medication compliance urged.  We discussed that for the  prevention of ASCVD our  goal is aggressive risk modification.Patient is encouraged to exercise if they can , educated about  brisk walk for 30 minutes  at least 3 to 4 times a week if there are no physical limitations  Various goals were discussed and questions answered. Continue current medications. Appropriate prescriptions are addressed and refills ordered.  Questions answered and patient verbalizes understanding.  Call for any problems, questions, or concerns.Greater than 60 % of time spent counseling besides reviewing data and images     Continue all other medications of all above medical condition listed as is.    No follow-ups on file.    Please note this report has been partially produced using speech recognition software and may contain errors related to that system including errors in grammar, punctuation, and spelling, as well as words and phrases that may be inappropriate. If there are any questions or concerns please feel free to contact the dictating provider for clarification.

## 2024-05-21 NOTE — PATIENT INSTRUCTIONS
Thank you for allowing us to care for you today!   We want to ensure we can follow your treatment plan and we strive to give you the best outcomes and experience possible.   If you ever have a life threatening emergency and call 911 - for an ambulance (EMS)   Our providers can only care for you at:   [facility] or [facility].   Even if you have someone take you or you drive yourself we can only care for you in a [facility] facility. Our providers are not setup at the other healthcare locations!   **It is YOUR responsibilty to bring medication bottles and/or updated medication list to EACH APPOINTMENT. This will allow us to better serve you and all your healthcare needs**  We are committed to providing you the best care possible.    If you receive a survey after visiting one of our offices, please take time to share your experience concerning your physician office visit.  These surveys are confidential and no health information about you is shared.    We are eager to improve for you and we are counting on your feedback to help make that happen.

## 2024-05-23 ENCOUNTER — HOSPITAL ENCOUNTER (OUTPATIENT)
Dept: PHYSICAL THERAPY | Age: 24
Discharge: HOME | End: 2024-05-23

## 2024-05-23 NOTE — FLOWSHEET NOTE
Physical Therapy  Cancellation/No-show Note  Patient Name:  Jeremias Choudhary  :  2000   Date:  2024  Cancelled visits to date: 14  No-shows to date: 2    For today's appointment patient:  []  Cancelled  []  Rescheduled appointment  []  No-show     Reason given by patient:  []  Patient ill  []  Conflicting appointment  []  No transportation    []  Conflict with work  []  No reason given  []  Other:     Comments:   Electronically signed by:  Florida Soria  PTA

## 2024-05-29 ENCOUNTER — HOSPITAL ENCOUNTER (OUTPATIENT)
Dept: PHYSICAL THERAPY | Age: 24
Discharge: HOME | End: 2024-05-29

## 2024-06-04 ENCOUNTER — TELEPHONE (OUTPATIENT)
Dept: CARDIOLOGY CLINIC | Age: 24
End: 2024-06-04

## 2024-06-04 NOTE — TELEPHONE ENCOUNTER
Mother wants Dr Ford to be notified that patient had epileptic episode on Saturday and again today. She has reports to other doctors as well. Her big concern is that left arm feels numb. States Dr Ford was very concerned last OV about how bad patient looked and may want earlier OV.

## 2024-06-05 ENCOUNTER — HOSPITAL ENCOUNTER (OUTPATIENT)
Dept: PHYSICAL THERAPY | Age: 24
Discharge: HOME | End: 2024-06-05

## 2024-06-05 NOTE — FLOWSHEET NOTE
Physical Therapy  Cancellation/No-show Note  Patient Name:  Jeremias Choudhary  :  2000   Date:  2024  Cancelled visits to date: 15  No-shows to date: 2    For today's appointment patient:  [x]  Cancelled  []  Rescheduled appointment  []  No-show     Reason given by patient:  []  Patient ill  []  Conflicting appointment  []  No transportation    []  Conflict with work  [x]  No reason given  []  Other:     Comments:   Electronically signed by:  Florida Soria  PTA

## 2024-06-12 ENCOUNTER — HOSPITAL ENCOUNTER (OUTPATIENT)
Dept: PHYSICAL THERAPY | Age: 24
Discharge: HOME | End: 2024-06-12

## 2024-06-12 NOTE — FLOWSHEET NOTE
Physical Therapy  Cancellation/No-show Note  Patient Name:  Jeremias Choudhary  :  2000   Date:  2024  Cancelled visits to date: 15  No-shows to date: 2    For today's appointment patient:  [x]  Cancelled  []  Rescheduled appointment  []  No-show     Reason given by patient:  [x]  Patient ill- mother reports that on the way to PT, Jeremias had an episode which required the mother to apply ammonia capsule to arouse her   []  Conflicting appointment  []  No transportation    []  Conflict with work  []  No reason given  []  Other:     Comments:   Electronically signed by:  CHANEL Zamora PT,

## 2024-06-18 ENCOUNTER — HOSPITAL ENCOUNTER (OUTPATIENT)
Dept: PHYSICAL THERAPY | Age: 24
Setting detail: THERAPIES SERIES
Discharge: HOME | End: 2024-06-18
Payer: COMMERCIAL

## 2024-06-18 PROCEDURE — 97113 AQUATIC THERAPY/EXERCISES: CPT

## 2024-06-18 NOTE — FLOWSHEET NOTE
Physical Therapy Aquatic Flow Sheet   Date:  2024    Patient Name:  Jeremias Choudhary    :  2000  Restrictions/Precautions:  syncope episodes  Diagnosis:     Treatment Diagnosis:     Insurance/Certification information:    Referring Physician:     Any changes in Ambulatory Summary Sheet?:  Plan of care signed (Y/N):    Visit# / total visits:  13/  Pain level: 8/10 \"overall body pain\"       Electronically signed by:  CHANEL Zamora PT,     Subjective:  patient reports she will have an appointment with her pain management doctor sean  Patient off work    Key  THERAPIST IN POOL D/T PATIENT SYNCOPE EPISODES  B= Belt DB= Dumbells T= Theratube   H= Hydrotone N= Noodles W= Weights   P= Paddles S= Speedo equipment K= Kickboard     Exercises/Activities   Warm-up/Amb    Exercises      Slow forward/backward    10 min; one UE rail support  HR/TR      Slow sideways  5 min; B rail support  Marches  2 x15 reps one UE support    Slow backwards    Mini-squats  15 x    Medium forward    3-way SLR  2 x15 eaa (all together) one UE support    Medium sideways    Hip circles/fig 8      Small shuffle    Hamstring curls      Jog    Knee extension      Braiding    Pelvic tilts      Bicycling    Scap squeezes          Shoulder flex/ext  Standing in  5 ft water 2 x15  postural cues    Functional  In/out of pool via steps and BUE supports; supervision for safety  Shoulder abd/add  Standing in  5 ft water 2 x15  postural cues    Step    Shoulder H. abd/add  Standing in  5 ft water 2 x15  postural cues    Lifting    Shoulder IR/ER      Hand to opp knee    Rowing      Push down squat    Bilateral pull down      UE PNF    Push/pull   LE PNF    Push downs      Wall push ups    Arm circles      SLS    Elbow flex/ext      Lunges    Chin tuck      Stretching    UT/rolls  2 X15 rolls    Gastroc/Soleus    Rocking horse      Hamstring          SKTC    Other      Piriformis    Dangle deep water      Hip flexor    Seated B UT stretch

## 2024-06-24 RX ORDER — GABAPENTIN 100 MG/1
200 CAPSULE ORAL 3 TIMES DAILY
Qty: 180 CAPSULE | Refills: 0 | OUTPATIENT
Start: 2024-06-24

## 2024-07-08 RX ORDER — GABAPENTIN 100 MG/1
200 CAPSULE ORAL 3 TIMES DAILY
Qty: 180 CAPSULE | Refills: 0 | OUTPATIENT
Start: 2024-07-08

## 2024-07-10 ENCOUNTER — HOSPITAL ENCOUNTER (OUTPATIENT)
Dept: PHYSICAL THERAPY | Age: 24
Setting detail: THERAPIES SERIES
Discharge: HOME | End: 2024-07-10
Payer: COMMERCIAL

## 2024-07-10 PROCEDURE — 97113 AQUATIC THERAPY/EXERCISES: CPT

## 2024-07-10 NOTE — FLOWSHEET NOTE
UT stretch and cervical rotation stretch      Ladder pull         Cervical SB         Cervical Rotation          Treatment Included:  [] Therapeutic Exercise   [] Instruction in HEP  [] Group   [] Therapeutic Activity      [] Neuromuscular Re-education [x] Aquatic   [] Gait             [] Manual  Other:    Time In/ Time Out  1535/1605  Timed Code Treatment [id number] min (3 aquatics)    Total Treatment Minutes:    If [employer] Please Indicate Units for Rx this date and running total for each and overall total for Rx to date:        80 units from 4/12/24 to 7/31/24.     CPT Code Units today Running Total Units Total approved    TE 67766       MAN 41897       Gait 04238      NR 53380      TA  81347      Estim Unatt 42146        05722      [facility] [address]      [facility] [address]       ADL/Self care 17380      DNT 1-2 97087      DNT 3-4 20561      Other:   31568  Aquatic 2 7 80          Total for episode of care             Objective Findings: Patient able to complete pool session with no episodes; cues to improve postural awareness.    Communication with other providers:    Education to Patient:    Adverse Reaction to Treatment: none    Assessment: Patient rated her pain 6/10 after treatment.  Noted some fatigue at the end of treatment.      Treatment/Activity Tolerance:  [x] Patient tolerated treatment well [] Patient limited by fatique  [] Patient limited by pain [] Patient limited by other medical complications  [] Other:     Prognosis: [x] Good [] Fair  [] Poor    Patient Requires Follow-up: [x] Yes  [] No    Plan:   [x] Continue per plan of care [] Alter current plan (see comments)  [] Plan of care initiated [] Hold pending MD visit [] Discharge    See Weekly Progress Note:    [] Yes [] No Next due:        Electronically signed by:  CHANEL Zamora PT, 7/10/2024, 5:14 PM

## 2024-07-22 RX ORDER — MIDODRINE HYDROCHLORIDE 10 MG/1
10 TABLET ORAL 3 TIMES DAILY
Qty: 90 TABLET | Refills: 5 | Status: SHIPPED | OUTPATIENT
Start: 2024-07-22

## 2024-07-31 ENCOUNTER — HOSPITAL ENCOUNTER (OUTPATIENT)
Dept: PHYSICAL THERAPY | Age: 24
Discharge: HOME | End: 2024-07-31

## 2024-08-05 RX ORDER — GABAPENTIN 100 MG/1
200 CAPSULE ORAL 3 TIMES DAILY
Qty: 180 CAPSULE | Refills: 0 | OUTPATIENT
Start: 2024-08-05

## 2024-08-19 RX ORDER — GABAPENTIN 100 MG/1
200 CAPSULE ORAL 3 TIMES DAILY
Qty: 180 CAPSULE | Refills: 0 | OUTPATIENT
Start: 2024-08-19

## 2024-08-22 ENCOUNTER — HOSPITAL ENCOUNTER (OUTPATIENT)
Dept: PHYSICAL THERAPY | Age: 24
Discharge: HOME | End: 2024-08-22

## 2024-08-22 NOTE — FLOWSHEET NOTE
Physical Therapy  Cancellation/No-show Note  Patient Name:  Jeremias Choudhary  :  2000   Date:  2024  Cancelled visits to date: 15  No-shows to date: 3    For today's appointment patient:  [x]  Cancelled  []  Rescheduled appointment  []  No-show     Reason given by patient:  []  Patient ill  []  Conflicting appointment  []  No transportation    []  Conflict with work  [x]  No reason given  []  Other:     Comments:   Electronically signed by:  [place] Soria  PTA

## 2024-08-27 RX ORDER — FLUDROCORTISONE ACETATE 0.1 MG/1
0.1 TABLET ORAL DAILY
Qty: 90 TABLET | Refills: 3 | Status: SHIPPED | OUTPATIENT
Start: 2024-08-27

## 2024-08-27 RX ORDER — POTASSIUM CHLORIDE 1500 MG/1
20 TABLET, EXTENDED RELEASE ORAL DAILY
Qty: 30 TABLET | Refills: 5 | Status: SHIPPED | OUTPATIENT
Start: 2024-08-27

## 2024-08-27 RX ORDER — MIDODRINE HYDROCHLORIDE 10 MG/1
10 TABLET ORAL 3 TIMES DAILY
Qty: 90 TABLET | Refills: 5 | Status: SHIPPED | OUTPATIENT
Start: 2024-08-27

## 2024-08-27 NOTE — TELEPHONE ENCOUNTER
Pt needs refills on midodrine  10mg  3x daily per mother [facility] sometimes has gives tablet if top 3 is a certain #.     Appt  9/16

## 2024-08-29 ENCOUNTER — HOSPITAL ENCOUNTER (OUTPATIENT)
Dept: PHYSICAL THERAPY | Age: 24
Discharge: HOME | End: 2024-08-29

## 2024-09-06 ENCOUNTER — HOSPITAL ENCOUNTER (OUTPATIENT)
Dept: PHYSICAL THERAPY | Age: 24
Discharge: HOME | End: 2024-09-06

## 2024-09-12 ENCOUNTER — TELEPHONE (OUTPATIENT)
Dept: CARDIOLOGY CLINIC | Age: 24
End: 2024-09-12

## 2024-09-12 RX ORDER — GABAPENTIN 100 MG/1
200 CAPSULE ORAL 3 TIMES DAILY
Qty: 180 CAPSULE | Refills: 0 | OUTPATIENT
Start: 2024-09-12 | End: 2027-08-29

## 2024-09-13 ENCOUNTER — HOSPITAL ENCOUNTER (OUTPATIENT)
Dept: PHYSICAL THERAPY | Age: 24
Discharge: HOME | End: 2024-09-13

## 2024-09-17 ENCOUNTER — HOSPITAL ENCOUNTER (OUTPATIENT)
Dept: PHYSICAL THERAPY | Age: 24
Setting detail: THERAPIES SERIES
Discharge: HOME | End: 2024-09-17
Payer: COMMERCIAL

## 2024-09-17 PROCEDURE — 97113 AQUATIC THERAPY/EXERCISES: CPT

## 2024-10-07 RX ORDER — GABAPENTIN 100 MG/1
200 CAPSULE ORAL 3 TIMES DAILY
Qty: 180 CAPSULE | Refills: 5 | OUTPATIENT
Start: 2024-10-07 | End: 2025-04-05

## 2024-10-16 ENCOUNTER — TELEPHONE (OUTPATIENT)
Dept: CARDIOLOGY CLINIC | Age: 24
End: 2024-10-16

## 2024-10-16 NOTE — TELEPHONE ENCOUNTER
LM w/pts parent to return call to office to reschedule OV on 11/4/24 w/ Dr. Ford due to provider being out of office. Pts parent also has appt on 11/4/24 that needs rescheduled as well.

## 2024-10-25 RX ORDER — GABAPENTIN 100 MG/1
200 CAPSULE ORAL 3 TIMES DAILY
Qty: 180 CAPSULE | Refills: 0 | OUTPATIENT
Start: 2024-10-25

## 2024-12-05 RX ORDER — FLUDROCORTISONE ACETATE 0.1 MG/1
0.1 TABLET ORAL DAILY
Qty: 90 TABLET | Refills: 3 | Status: SHIPPED | OUTPATIENT
Start: 2024-12-05

## 2024-12-05 RX ORDER — MIDODRINE HYDROCHLORIDE 10 MG/1
10 TABLET ORAL 3 TIMES DAILY
Qty: 90 TABLET | Refills: 5 | Status: SHIPPED | OUTPATIENT
Start: 2024-12-05

## 2024-12-05 RX ORDER — POTASSIUM CHLORIDE 1500 MG/1
20 TABLET, EXTENDED RELEASE ORAL DAILY
Qty: 90 TABLET | Refills: 1 | Status: SHIPPED | OUTPATIENT
Start: 2024-12-05

## 2024-12-05 RX ORDER — GABAPENTIN 100 MG/1
200 CAPSULE ORAL 3 TIMES DAILY
Qty: 180 CAPSULE | Refills: 0 | Status: SHIPPED | OUTPATIENT
Start: 2024-12-05 | End: 2027-11-21

## 2024-12-05 NOTE — TELEPHONE ENCOUNTER
Pt is needing Fludrocortisone 0.1 mg, gabapentin 100mg, [id number], potassium chloride 20meq. 90 day supply

## 2025-01-23 RX ORDER — FLUDROCORTISONE ACETATE 0.1 MG/1
0.1 TABLET ORAL DAILY
Qty: 90 TABLET | Refills: 3 | OUTPATIENT
Start: 2025-01-23

## 2025-02-10 RX ORDER — GABAPENTIN 100 MG/1
200 CAPSULE ORAL 3 TIMES DAILY
Qty: 180 CAPSULE | Refills: 5 | OUTPATIENT
Start: 2025-02-10 | End: 2028-01-27

## 2025-02-21 RX ORDER — MIDODRINE HYDROCHLORIDE 10 MG/1
10 TABLET ORAL 3 TIMES DAILY
Qty: 90 TABLET | Refills: 5 | OUTPATIENT
Start: 2025-02-21

## 2025-02-21 RX ORDER — GABAPENTIN 100 MG/1
200 CAPSULE ORAL 3 TIMES DAILY
Qty: 180 CAPSULE | Refills: 0 | OUTPATIENT
Start: 2025-02-21 | End: 2028-02-07

## 2025-02-21 RX ORDER — POTASSIUM CHLORIDE 1500 MG/1
20 TABLET, EXTENDED RELEASE ORAL DAILY
Qty: 90 TABLET | Refills: 1 | OUTPATIENT
Start: 2025-02-21

## 2025-02-21 RX ORDER — FLUDROCORTISONE ACETATE 0.1 MG/1
0.1 TABLET ORAL DAILY
Qty: 90 TABLET | Refills: 3 | OUTPATIENT
Start: 2025-02-21

## 2025-03-07 RX ORDER — POTASSIUM CHLORIDE 1500 MG/1
20 TABLET, EXTENDED RELEASE ORAL DAILY
Qty: 90 TABLET | Refills: 1 | Status: SHIPPED | OUTPATIENT
Start: 2025-03-07

## 2025-05-09 NOTE — PROGRESS NOTES
MRN: [id number]  Name: Jeremias Choudhary  : 2000    Insurance: Payor: [facility] /  /  /      Phone #: [phone]  Provider: Gwendolyn Ford MD     Date of Visit: 2025    Reason for visit:  Recent Hospitalization Date:    Reason for Hospitalization:    Last EK2024  Type of Device:       Vitals BP HR O2% WT HT ORTHO BP LYING ORTHO BP SITTING ORTHO BP SITTING   Today's Findings           Patients work up- Check List     Testing Last Date Completed Date Expected  ([facility]) Additional Notes    MA to document For provider to complete Either MA or Provider    Carotid Duplex  STAT 1 WK 6 MTH       THIS WK 2 WK 1 YEAR     Cardiac CTA  STAT 1 WK 6 MTH       THIS WK 2 WK 1 YEAR     Cardiac CT Calcium scoring  STAT 1 WK 6 MTH       THIS WK 2 WK 1 YEAR     CTA Chest, Abdomen & Pelvis  STAT 1 WK 6 MTH       THIS WK 2 WK 1 YEAR     CT Chest IV w/ Contrast  STAT 1 WK 6 MTH       THIS WK 2 WK 1 YEAR     CT Chest w/o Contrast  STAT 1 WK 6 MTH       THIS WK 2 WK 1 YEAR     CXR  STAT 1 WK 6 MTH       THIS WK 2 WK 1 YEAR     ECHO  Stress Complete Limited     MRI- Cardiac  STAT 1 WK 6 MTH       THIS WK 2 WK 1 YEAR     MUGA Scan  STAT 1 WK 6 MTH       THIS WK 2 WK 1 YEAR     Nuclear Stress  Lexiscan Cardiolite     PFT  STAT 1 WK 6 MTH       THIS WK 2 WK 1 YEAR     Treadmill Stress Test  STAT 1 WK 6 MTH       THIS WK 2 WK 1 YEAR     Vascular Duplex  Lower: Right Left Bilat       Upper: Right Left Bilat     Other Test Not Listed:    Monitors Last Date Completed Day's Request/Ordered     Holter  Short term 24 hours 48 hours      Long term 3 days 7 days 14 days   Event   (1-30 days)      Procedures Last Date Performed Procedure Details Date Expected   Additional Notes    ASD Closure        Carotid Angio        Cardioversion        Heart Cath  R L R&L      Peripheral Angio  R L      PFO Closure        PTCA/PCI        KAYLA        KAYLA/Cardioversion        Venogram        Tilt Table        Other Type of Procedure:

## 2025-06-05 NOTE — PROGRESS NOTES
MRN: [id number]  Name: Jeremias Choudhary  : 2000    Insurance: Payor: [facility] /  /  /      Phone #: [phone]  Provider: Gwendolyn Ford MD     Date of Visit: 6/10/2025    Reason for visit:  Recent Hospitalization Date:    Reason for Hospitalization:    Last EK2024  Type of Device:       Vitals BP HR O2% WT HT ORTHO BP LYING ORTHO BP SITTING ORTHO BP SITTING   Today's Findings           Patients work up- Check List     Testing Last Date Completed Date Expected  ([place] [facility]) Additional Notes    MA to document For provider to complete Either MA or Provider    Carotid Duplex  STAT 1 WK 6 MTH       THIS WK 2 WK 1 YEAR     Cardiac CTA  STAT 1 WK 6 MTH       THIS WK 2 WK 1 YEAR     Cardiac CT Calcium scoring  STAT 1 WK 6 MTH       THIS WK 2 WK 1 YEAR     CTA Chest, Abdomen & Pelvis  STAT 1 WK 6 MTH       THIS WK 2 WK 1 YEAR     CT Chest IV w/ Contrast  STAT 1 WK 6 MTH       THIS WK 2 WK 1 YEAR     CT Chest w/o Contrast  STAT 1 WK 6 MTH       THIS WK 2 WK 1 YEAR     CXR  STAT 1 WK 6 MTH       THIS WK 2 WK 1 YEAR     ECHO  Stress Complete Limited     MRI- Cardiac  STAT 1 WK 6 MTH       THIS WK 2 WK 1 YEAR     MUGA Scan  STAT 1 WK 6 MTH       THIS WK 2 WK 1 YEAR     Nuclear Stress  Lexiscan Cardiolite     PFT  STAT 1 WK 6 MTH       THIS WK 2 WK 1 YEAR     Treadmill Stress Test  STAT 1 WK 6 MTH       THIS WK 2 WK 1 YEAR     Vascular Duplex  Lower: Right Left Bilat       Upper: Right Left Bilat     Other Test Not Listed:    Monitors Last Date Completed Day's Request/Ordered     Holter  Short term 24 hours 48 hours      Long term 3 days 7 days 14 days   Event   (1-30 days)      Procedures Last Date Performed Procedure Details Date Expected   Additional Notes    ASD Closure        Carotid Angio        Cardioversion        Heart Cath  R L R&L      Peripheral Angio  R L      PFO Closure        PTCA/PCI        KYALA        KAYLA/Cardioversion        Venogram        Tilt Table        Other Type of Procedure:

## 2025-06-10 ENCOUNTER — OFFICE VISIT (OUTPATIENT)
Dept: CARDIOLOGY CLINIC | Age: 25
End: 2025-06-10
Payer: COMMERCIAL

## 2025-06-10 ENCOUNTER — TELEPHONE (OUTPATIENT)
Dept: CARDIOLOGY CLINIC | Age: 25
End: 2025-06-10

## 2025-06-10 VITALS
SYSTOLIC BLOOD PRESSURE: 96 MMHG | DIASTOLIC BLOOD PRESSURE: 64 MMHG | BODY MASS INDEX: 24.11 KG/M2 | HEART RATE: 64 BPM | WEIGHT: 150 LBS | HEIGHT: 66 IN

## 2025-06-10 DIAGNOSIS — R55 VASOVAGAL SYNCOPE: ICD-10-CM

## 2025-06-10 DIAGNOSIS — R42 DIZZINESS: ICD-10-CM

## 2025-06-10 DIAGNOSIS — I95.1 ORTHOSTATIC HYPOTENSION: Primary | ICD-10-CM

## 2025-06-10 DIAGNOSIS — G47.33 OSA (OBSTRUCTIVE SLEEP APNEA): ICD-10-CM

## 2025-06-10 DIAGNOSIS — F44.4 FUNCTIONAL NEUROLOGICAL SYMPTOM DISORDER WITH ABNORMAL MOVEMENT: ICD-10-CM

## 2025-06-10 DIAGNOSIS — R07.2 PRECORDIAL PAIN: ICD-10-CM

## 2025-06-10 DIAGNOSIS — F32.A DEPRESSION, UNSPECIFIED DEPRESSION TYPE: ICD-10-CM

## 2025-06-10 PROCEDURE — 93000 ELECTROCARDIOGRAM COMPLETE: CPT | Performed by: INTERNAL MEDICINE

## 2025-06-10 PROCEDURE — G8427 DOCREV CUR MEDS BY ELIG CLIN: HCPCS | Performed by: INTERNAL MEDICINE

## 2025-06-10 PROCEDURE — 99214 OFFICE O/P EST MOD 30 MIN: CPT | Performed by: INTERNAL MEDICINE

## 2025-06-10 PROCEDURE — G8420 CALC BMI NORM PARAMETERS: HCPCS | Performed by: INTERNAL MEDICINE

## 2025-06-10 PROCEDURE — 1036F TOBACCO NON-USER: CPT | Performed by: INTERNAL MEDICINE

## 2025-06-10 RX ORDER — POTASSIUM CHLORIDE 1500 MG/1
20 TABLET, EXTENDED RELEASE ORAL DAILY
Qty: 90 TABLET | Refills: 1 | Status: SHIPPED | OUTPATIENT
Start: 2025-06-10

## 2025-06-10 RX ORDER — TOPIRAMATE 50 MG/1
150 TABLET, FILM COATED ORAL 2 TIMES DAILY
Qty: 90 TABLET | Refills: 0 | Status: CANCELLED | OUTPATIENT
Start: 2025-06-10

## 2025-06-10 RX ORDER — FLUDROCORTISONE ACETATE 0.1 MG/1
0.1 TABLET ORAL DAILY
Qty: 90 TABLET | Refills: 3 | Status: SHIPPED | OUTPATIENT
Start: 2025-06-10

## 2025-06-10 RX ORDER — MIDODRINE HYDROCHLORIDE 10 MG/1
10 TABLET ORAL 3 TIMES DAILY
Qty: 90 TABLET | Refills: 5 | Status: SHIPPED | OUTPATIENT
Start: 2025-06-10

## 2025-06-10 NOTE — PROGRESS NOTES
CLINICAL STAFF DOCUMENTATION    Dr. Gwendolyn Ford     Jeremias Tomas [facility]  2000  [id number]    Have you had any Chest Pain recently? - Yes  If Yes DO EKG   When did the pain begin? - Years   What type of pain is it? - Tightness   Tender to palpate (touch)? Yes  Does the pain radiate or stay in one place? - moves to Rt side  How long does the pain last? - 10 minutes    How Severe is the pain? - Is there anything that aggravates or triggers the pain?   Did you take any medication?    And did it relieve the pain -   Is there anything that relieves it?   Do you have any other symptoms at the same time?     DO EKG IF: Patient has a Heart Rate above 100 or below 50 (Ex:A-fib, Aflutter, PAF, SVT, VT, Bradycardia)      CAD (Coronary Artery Disease) patient should have one on file every 6 months     New Consult or New Patient     If patient is following up from a current Stent/PCI     If patient is following up from a current Cardioversion        Have you had any Shortness of Breath - Yes  When did it begin? - Years   If Yes - When at rest and on exertion  How many flights of stairs can you go up without having SOB? -   Are you on Oxygen during the day or at night? - No  How many liters of Oxygen are you on? -   How many pillows do you sleep with under your head? - 2    Have you had any dizziness - Yes  If Yes DO ORTHOSTATIC BP including pulse  When do you feel dizzy?   Does the room spin? Yes  How long does it last .  hours     Have patient lie down for 5 minutes then measure blood pressure and pulse rate.   Have the patient stand.   Repeat blood pressure and pulse rate after patient has been standing for 1 minute   Repeat blood pressure and pulse rate after patient has been standing for 3 minutes.   Be sure to ask what symptoms they are having if they get dizzy while completing ortho stats such as: room spinning, nausea, etc.    Have you had any palpitations recently? - Yes  If Yes DO EKG - Do you feel your heart 
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  Patient was advised to repeat lab work before next visit. Necessary orders were placed , instructions given by myself       Counseled extensively and medication compliance urged.  We discussed that for the  prevention of ASCVD our  goal is aggressive risk modification.Patient is encouraged to exercise if they can , educated about  brisk walk for 30 minutes  at least 3 to 4 times a week if there are no physical limitations  Various goals were discussed and questions answered. Continue current medications. Appropriate prescriptions are addressed and refills ordered.  Questions answered and patient verbalizes understanding.  Call for any problems, questions, or concerns.Greater than 60 % of time spent counseling besides reviewing data and images     Continue all other medications of all above medical condition listed as is.    No follow-ups on file.    Please note this report has been partially produced using speech recognition software and may contain errors related to that system including errors in grammar, punctuation, and spelling, as well as words and phrases that may be inappropriate. If there are any questions or concerns please feel free to contact the dictating provider for clarification.

## 2025-09-02 DIAGNOSIS — I95.1 ORTHOSTATIC HYPOTENSION: Primary | ICD-10-CM
